# Patient Record
Sex: FEMALE | Race: WHITE | Employment: OTHER | URBAN - METROPOLITAN AREA
[De-identification: names, ages, dates, MRNs, and addresses within clinical notes are randomized per-mention and may not be internally consistent; named-entity substitution may affect disease eponyms.]

---

## 2017-02-04 ENCOUNTER — COMMUNICATION - HEALTHEAST (OUTPATIENT)
Dept: INTERNAL MEDICINE | Facility: CLINIC | Age: 72
End: 2017-02-04

## 2017-03-02 ENCOUNTER — COMMUNICATION - HEALTHEAST (OUTPATIENT)
Dept: INTERNAL MEDICINE | Facility: CLINIC | Age: 72
End: 2017-03-02

## 2017-04-19 ENCOUNTER — RECORDS - HEALTHEAST (OUTPATIENT)
Dept: ADMINISTRATIVE | Facility: OTHER | Age: 72
End: 2017-04-19

## 2017-04-19 ENCOUNTER — AMBULATORY - HEALTHEAST (OUTPATIENT)
Dept: ADMINISTRATIVE | Facility: REHABILITATION | Age: 72
End: 2017-04-19

## 2017-04-19 DIAGNOSIS — R42 DIZZINESS AND GIDDINESS: ICD-10-CM

## 2017-04-19 DIAGNOSIS — H90.5 SENSORINEURAL HEARING LOSS: ICD-10-CM

## 2017-05-15 ENCOUNTER — RECORDS - HEALTHEAST (OUTPATIENT)
Dept: ADMINISTRATIVE | Facility: OTHER | Age: 72
End: 2017-05-15

## 2017-06-20 ENCOUNTER — RECORDS - HEALTHEAST (OUTPATIENT)
Dept: ADMINISTRATIVE | Facility: OTHER | Age: 72
End: 2017-06-20

## 2017-08-30 ENCOUNTER — COMMUNICATION - HEALTHEAST (OUTPATIENT)
Dept: INTERNAL MEDICINE | Facility: CLINIC | Age: 72
End: 2017-08-30

## 2017-10-16 ENCOUNTER — COMMUNICATION - HEALTHEAST (OUTPATIENT)
Dept: INTERNAL MEDICINE | Facility: CLINIC | Age: 72
End: 2017-10-16

## 2017-10-16 ENCOUNTER — OFFICE VISIT - HEALTHEAST (OUTPATIENT)
Dept: INTERNAL MEDICINE | Facility: CLINIC | Age: 72
End: 2017-10-16

## 2017-10-16 DIAGNOSIS — E78.00 PURE HYPERCHOLESTEROLEMIA: ICD-10-CM

## 2017-10-16 DIAGNOSIS — Z01.818 PRE-OP EXAM: ICD-10-CM

## 2017-10-16 DIAGNOSIS — H26.9 CATARACT OF RIGHT EYE, UNSPECIFIED CATARACT TYPE: ICD-10-CM

## 2017-10-16 DIAGNOSIS — I10 ESSENTIAL HYPERTENSION: ICD-10-CM

## 2017-10-16 DIAGNOSIS — Z51.81 MEDICATION MONITORING ENCOUNTER: ICD-10-CM

## 2017-10-16 DIAGNOSIS — M85.80 OSTEOPENIA, UNSPECIFIED LOCATION: ICD-10-CM

## 2017-10-16 DIAGNOSIS — Z78.0 POST-MENOPAUSAL: ICD-10-CM

## 2017-10-16 LAB
ATRIAL RATE - MUSE: 53 BPM
DIASTOLIC BLOOD PRESSURE - MUSE: NORMAL MMHG
INTERPRETATION ECG - MUSE: NORMAL
P AXIS - MUSE: 34 DEGREES
PR INTERVAL - MUSE: 160 MS
QRS DURATION - MUSE: 92 MS
QT - MUSE: 458 MS
QTC - MUSE: 429 MS
R AXIS - MUSE: 28 DEGREES
SYSTOLIC BLOOD PRESSURE - MUSE: NORMAL MMHG
T AXIS - MUSE: -22 DEGREES
VENTRICULAR RATE- MUSE: 53 BPM

## 2017-10-16 ASSESSMENT — MIFFLIN-ST. JEOR: SCORE: 1287.4

## 2017-10-31 ENCOUNTER — COMMUNICATION - HEALTHEAST (OUTPATIENT)
Dept: INTERNAL MEDICINE | Facility: CLINIC | Age: 72
End: 2017-10-31

## 2017-10-31 DIAGNOSIS — E78.00 PURE HYPERCHOLESTEROLEMIA: ICD-10-CM

## 2017-11-07 ENCOUNTER — COMMUNICATION - HEALTHEAST (OUTPATIENT)
Dept: INTERNAL MEDICINE | Facility: CLINIC | Age: 72
End: 2017-11-07

## 2017-11-07 DIAGNOSIS — I10 ESSENTIAL HYPERTENSION: ICD-10-CM

## 2017-12-11 ENCOUNTER — RECORDS - HEALTHEAST (OUTPATIENT)
Dept: ADMINISTRATIVE | Facility: OTHER | Age: 72
End: 2017-12-11

## 2017-12-12 ENCOUNTER — OFFICE VISIT - HEALTHEAST (OUTPATIENT)
Dept: INTERNAL MEDICINE | Facility: CLINIC | Age: 72
End: 2017-12-12

## 2017-12-12 DIAGNOSIS — J32.9 SINUSITIS, UNSPECIFIED CHRONICITY, UNSPECIFIED LOCATION: ICD-10-CM

## 2017-12-12 DIAGNOSIS — H53.8 BLURRY VISION, RIGHT EYE: ICD-10-CM

## 2017-12-12 DIAGNOSIS — I10 ESSENTIAL HYPERTENSION: ICD-10-CM

## 2017-12-12 DIAGNOSIS — J30.89 CHRONIC NON-SEASONAL ALLERGIC RHINITIS, UNSPECIFIED TRIGGER: ICD-10-CM

## 2017-12-12 DIAGNOSIS — Z01.818 PREOP EXAMINATION: ICD-10-CM

## 2017-12-12 RX ORDER — KETOROLAC TROMETHAMINE 4 MG/ML
SOLUTION/ DROPS OPHTHALMIC
Refills: 4 | Status: SHIPPED | COMMUNITY
Start: 2017-11-29 | End: 2021-12-27

## 2017-12-12 ASSESSMENT — MIFFLIN-ST. JEOR: SCORE: 1278.33

## 2018-01-04 ENCOUNTER — RECORDS - HEALTHEAST (OUTPATIENT)
Dept: ADMINISTRATIVE | Facility: OTHER | Age: 73
End: 2018-01-04

## 2018-04-24 ENCOUNTER — RECORDS - HEALTHEAST (OUTPATIENT)
Dept: ADMINISTRATIVE | Facility: OTHER | Age: 73
End: 2018-04-24

## 2018-05-07 ENCOUNTER — RECORDS - HEALTHEAST (OUTPATIENT)
Dept: ADMINISTRATIVE | Facility: OTHER | Age: 73
End: 2018-05-07

## 2018-06-15 ENCOUNTER — OFFICE VISIT - HEALTHEAST (OUTPATIENT)
Dept: INTERNAL MEDICINE | Facility: CLINIC | Age: 73
End: 2018-06-15

## 2018-06-15 DIAGNOSIS — R07.89 RIGHT-SIDED CHEST WALL PAIN: ICD-10-CM

## 2018-06-15 DIAGNOSIS — E78.00 PURE HYPERCHOLESTEROLEMIA: ICD-10-CM

## 2018-06-15 DIAGNOSIS — Z51.81 MEDICATION MONITORING ENCOUNTER: ICD-10-CM

## 2018-06-15 DIAGNOSIS — I10 ESSENTIAL HYPERTENSION: ICD-10-CM

## 2018-06-15 LAB
ALBUMIN SERPL-MCNC: 3.9 G/DL (ref 3.5–5)
ALP SERPL-CCNC: 61 U/L (ref 45–120)
ALT SERPL W P-5'-P-CCNC: 25 U/L (ref 0–45)
ANION GAP SERPL CALCULATED.3IONS-SCNC: 7 MMOL/L (ref 5–18)
AST SERPL W P-5'-P-CCNC: 27 U/L (ref 0–40)
BASOPHILS # BLD AUTO: 0 THOU/UL (ref 0–0.2)
BASOPHILS NFR BLD AUTO: 1 % (ref 0–2)
BILIRUB SERPL-MCNC: 0.7 MG/DL (ref 0–1)
BUN SERPL-MCNC: 16 MG/DL (ref 8–28)
CALCIUM SERPL-MCNC: 9.4 MG/DL (ref 8.5–10.5)
CHLORIDE BLD-SCNC: 108 MMOL/L (ref 98–107)
CHOLEST SERPL-MCNC: 238 MG/DL
CK SERPL-CCNC: 59 U/L (ref 30–190)
CO2 SERPL-SCNC: 28 MMOL/L (ref 22–31)
CREAT SERPL-MCNC: 0.81 MG/DL (ref 0.6–1.1)
EOSINOPHIL # BLD AUTO: 0.1 THOU/UL (ref 0–0.4)
EOSINOPHIL NFR BLD AUTO: 2 % (ref 0–6)
ERYTHROCYTE [DISTWIDTH] IN BLOOD BY AUTOMATED COUNT: 12 % (ref 11–14.5)
FASTING STATUS PATIENT QL REPORTED: YES
GFR SERPL CREATININE-BSD FRML MDRD: >60 ML/MIN/1.73M2
GLUCOSE BLD-MCNC: 92 MG/DL (ref 70–125)
HCT VFR BLD AUTO: 44 % (ref 35–47)
HDLC SERPL-MCNC: 60 MG/DL
HGB BLD-MCNC: 14.6 G/DL (ref 12–16)
LDLC SERPL CALC-MCNC: 156 MG/DL
LYMPHOCYTES # BLD AUTO: 1.5 THOU/UL (ref 0.8–4.4)
LYMPHOCYTES NFR BLD AUTO: 35 % (ref 20–40)
MCH RBC QN AUTO: 30.3 PG (ref 27–34)
MCHC RBC AUTO-ENTMCNC: 33.1 G/DL (ref 32–36)
MCV RBC AUTO: 92 FL (ref 80–100)
MONOCYTES # BLD AUTO: 0.5 THOU/UL (ref 0–0.9)
MONOCYTES NFR BLD AUTO: 11 % (ref 2–10)
NEUTROPHILS # BLD AUTO: 2.2 THOU/UL (ref 2–7.7)
NEUTROPHILS NFR BLD AUTO: 52 % (ref 50–70)
PLATELET # BLD AUTO: 209 THOU/UL (ref 140–440)
PMV BLD AUTO: 7.4 FL (ref 7–10)
POTASSIUM BLD-SCNC: 4.5 MMOL/L (ref 3.5–5)
PROT SERPL-MCNC: 6.4 G/DL (ref 6–8)
RBC # BLD AUTO: 4.8 MILL/UL (ref 3.8–5.4)
SODIUM SERPL-SCNC: 143 MMOL/L (ref 136–145)
TRIGL SERPL-MCNC: 109 MG/DL
WBC: 4.3 THOU/UL (ref 4–11)

## 2018-06-15 ASSESSMENT — MIFFLIN-ST. JEOR: SCORE: 1287.4

## 2018-06-18 ENCOUNTER — RECORDS - HEALTHEAST (OUTPATIENT)
Dept: ADMINISTRATIVE | Facility: OTHER | Age: 73
End: 2018-06-18

## 2018-06-18 ENCOUNTER — COMMUNICATION - HEALTHEAST (OUTPATIENT)
Dept: INTERNAL MEDICINE | Facility: CLINIC | Age: 73
End: 2018-06-18

## 2018-08-27 ENCOUNTER — COMMUNICATION - HEALTHEAST (OUTPATIENT)
Dept: INTERNAL MEDICINE | Facility: CLINIC | Age: 73
End: 2018-08-27

## 2018-08-27 DIAGNOSIS — I10 ESSENTIAL HYPERTENSION: ICD-10-CM

## 2018-10-27 ENCOUNTER — COMMUNICATION - HEALTHEAST (OUTPATIENT)
Dept: INTERNAL MEDICINE | Facility: CLINIC | Age: 73
End: 2018-10-27

## 2018-10-27 DIAGNOSIS — E78.00 PURE HYPERCHOLESTEROLEMIA: ICD-10-CM

## 2018-10-27 DIAGNOSIS — I10 ESSENTIAL HYPERTENSION: ICD-10-CM

## 2019-05-17 ENCOUNTER — RECORDS - HEALTHEAST (OUTPATIENT)
Dept: ADMINISTRATIVE | Facility: OTHER | Age: 74
End: 2019-05-17

## 2019-05-25 ENCOUNTER — COMMUNICATION - HEALTHEAST (OUTPATIENT)
Dept: INTERNAL MEDICINE | Facility: CLINIC | Age: 74
End: 2019-05-25

## 2019-05-25 DIAGNOSIS — I10 ESSENTIAL HYPERTENSION: ICD-10-CM

## 2019-06-11 ENCOUNTER — RECORDS - HEALTHEAST (OUTPATIENT)
Dept: ADMINISTRATIVE | Facility: OTHER | Age: 74
End: 2019-06-11

## 2019-07-26 ENCOUNTER — COMMUNICATION - HEALTHEAST (OUTPATIENT)
Dept: INTERNAL MEDICINE | Facility: CLINIC | Age: 74
End: 2019-07-26

## 2019-07-26 DIAGNOSIS — I10 ESSENTIAL HYPERTENSION: ICD-10-CM

## 2019-07-26 DIAGNOSIS — E78.00 PURE HYPERCHOLESTEROLEMIA: ICD-10-CM

## 2019-08-09 ENCOUNTER — RECORDS - HEALTHEAST (OUTPATIENT)
Dept: ADMINISTRATIVE | Facility: OTHER | Age: 74
End: 2019-08-09

## 2019-08-25 ENCOUNTER — COMMUNICATION - HEALTHEAST (OUTPATIENT)
Dept: INTERNAL MEDICINE | Facility: CLINIC | Age: 74
End: 2019-08-25

## 2019-08-25 DIAGNOSIS — I10 ESSENTIAL HYPERTENSION: ICD-10-CM

## 2019-10-23 ENCOUNTER — COMMUNICATION - HEALTHEAST (OUTPATIENT)
Dept: INTERNAL MEDICINE | Facility: CLINIC | Age: 74
End: 2019-10-23

## 2019-10-23 DIAGNOSIS — I10 ESSENTIAL HYPERTENSION: ICD-10-CM

## 2019-10-23 DIAGNOSIS — E78.00 PURE HYPERCHOLESTEROLEMIA: ICD-10-CM

## 2019-11-13 ENCOUNTER — RECORDS - HEALTHEAST (OUTPATIENT)
Dept: ADMINISTRATIVE | Facility: OTHER | Age: 74
End: 2019-11-13

## 2020-01-22 ENCOUNTER — COMMUNICATION - HEALTHEAST (OUTPATIENT)
Dept: INTERNAL MEDICINE | Facility: CLINIC | Age: 75
End: 2020-01-22

## 2020-01-22 DIAGNOSIS — I10 ESSENTIAL HYPERTENSION: ICD-10-CM

## 2020-01-22 DIAGNOSIS — E78.00 PURE HYPERCHOLESTEROLEMIA: ICD-10-CM

## 2020-04-21 ENCOUNTER — COMMUNICATION - HEALTHEAST (OUTPATIENT)
Dept: INTERNAL MEDICINE | Facility: CLINIC | Age: 75
End: 2020-04-21

## 2020-04-21 DIAGNOSIS — E78.00 PURE HYPERCHOLESTEROLEMIA: ICD-10-CM

## 2020-04-21 DIAGNOSIS — I10 ESSENTIAL HYPERTENSION: ICD-10-CM

## 2020-04-29 ENCOUNTER — OFFICE VISIT - HEALTHEAST (OUTPATIENT)
Dept: INTERNAL MEDICINE | Facility: CLINIC | Age: 75
End: 2020-04-29

## 2020-04-29 DIAGNOSIS — E78.00 PURE HYPERCHOLESTEROLEMIA: ICD-10-CM

## 2020-04-29 DIAGNOSIS — I10 ESSENTIAL HYPERTENSION: ICD-10-CM

## 2020-04-29 DIAGNOSIS — R25.1 TREMOR: ICD-10-CM

## 2020-04-29 DIAGNOSIS — Z12.11 SCREENING FOR COLON CANCER: ICD-10-CM

## 2020-04-29 DIAGNOSIS — Z51.81 ENCOUNTER FOR THERAPEUTIC DRUG MONITORING: ICD-10-CM

## 2020-04-29 LAB
ALBUMIN SERPL-MCNC: 3.9 G/DL (ref 3.5–5)
ALP SERPL-CCNC: 65 U/L (ref 45–120)
ALT SERPL W P-5'-P-CCNC: 13 U/L (ref 0–45)
ANION GAP SERPL CALCULATED.3IONS-SCNC: 10 MMOL/L (ref 5–18)
AST SERPL W P-5'-P-CCNC: 16 U/L (ref 0–40)
BILIRUB SERPL-MCNC: 0.6 MG/DL (ref 0–1)
BUN SERPL-MCNC: 15 MG/DL (ref 8–28)
CALCIUM SERPL-MCNC: 9 MG/DL (ref 8.5–10.5)
CHLORIDE BLD-SCNC: 104 MMOL/L (ref 98–107)
CHOLEST SERPL-MCNC: 222 MG/DL
CO2 SERPL-SCNC: 28 MMOL/L (ref 22–31)
CREAT SERPL-MCNC: 0.79 MG/DL (ref 0.6–1.1)
FASTING STATUS PATIENT QL REPORTED: YES
GFR SERPL CREATININE-BSD FRML MDRD: >60 ML/MIN/1.73M2
GLUCOSE BLD-MCNC: 83 MG/DL (ref 70–125)
HDLC SERPL-MCNC: 64 MG/DL
LDLC SERPL CALC-MCNC: 137 MG/DL
POTASSIUM BLD-SCNC: 4.1 MMOL/L (ref 3.5–5)
PROT SERPL-MCNC: 6.7 G/DL (ref 6–8)
SODIUM SERPL-SCNC: 142 MMOL/L (ref 136–145)
TRIGL SERPL-MCNC: 103 MG/DL

## 2020-04-30 ENCOUNTER — COMMUNICATION - HEALTHEAST (OUTPATIENT)
Dept: INTERNAL MEDICINE | Facility: CLINIC | Age: 75
End: 2020-04-30

## 2020-05-15 ENCOUNTER — OFFICE VISIT - HEALTHEAST (OUTPATIENT)
Dept: INTERNAL MEDICINE | Facility: CLINIC | Age: 75
End: 2020-05-15

## 2020-05-15 DIAGNOSIS — I10 ESSENTIAL HYPERTENSION: ICD-10-CM

## 2020-06-19 ENCOUNTER — COMMUNICATION - HEALTHEAST (OUTPATIENT)
Dept: INTERNAL MEDICINE | Facility: CLINIC | Age: 75
End: 2020-06-19

## 2020-06-19 DIAGNOSIS — I10 ESSENTIAL HYPERTENSION: ICD-10-CM

## 2020-07-21 ENCOUNTER — COMMUNICATION - HEALTHEAST (OUTPATIENT)
Dept: INTERNAL MEDICINE | Facility: CLINIC | Age: 75
End: 2020-07-21

## 2020-07-21 DIAGNOSIS — E78.00 PURE HYPERCHOLESTEROLEMIA: ICD-10-CM

## 2020-07-21 DIAGNOSIS — I10 ESSENTIAL HYPERTENSION: ICD-10-CM

## 2020-07-22 RX ORDER — AMLODIPINE BESYLATE 2.5 MG/1
TABLET ORAL
Qty: 90 TABLET | Refills: 2 | Status: SHIPPED | OUTPATIENT
Start: 2020-07-22 | End: 2021-12-27 | Stop reason: DRUGHIGH

## 2020-08-16 ENCOUNTER — COMMUNICATION - HEALTHEAST (OUTPATIENT)
Dept: INTERNAL MEDICINE | Facility: CLINIC | Age: 75
End: 2020-08-16

## 2020-08-16 DIAGNOSIS — I10 ESSENTIAL HYPERTENSION: ICD-10-CM

## 2020-12-01 ENCOUNTER — RECORDS - HEALTHEAST (OUTPATIENT)
Dept: ADMINISTRATIVE | Facility: OTHER | Age: 75
End: 2020-12-01

## 2021-01-20 ENCOUNTER — COMMUNICATION - HEALTHEAST (OUTPATIENT)
Dept: INTERNAL MEDICINE | Facility: CLINIC | Age: 76
End: 2021-01-20

## 2021-01-20 DIAGNOSIS — E78.00 PURE HYPERCHOLESTEROLEMIA: ICD-10-CM

## 2021-01-21 RX ORDER — PRAVASTATIN SODIUM 20 MG
20 TABLET ORAL DAILY
Qty: 90 TABLET | Refills: 2 | Status: SHIPPED | OUTPATIENT
Start: 2021-01-21 | End: 2022-01-21

## 2021-01-29 ENCOUNTER — RECORDS - HEALTHEAST (OUTPATIENT)
Dept: ADMINISTRATIVE | Facility: OTHER | Age: 76
End: 2021-01-29

## 2021-02-11 ENCOUNTER — AMBULATORY - HEALTHEAST (OUTPATIENT)
Dept: NURSING | Facility: CLINIC | Age: 76
End: 2021-02-11

## 2021-03-04 ENCOUNTER — AMBULATORY - HEALTHEAST (OUTPATIENT)
Dept: NURSING | Facility: CLINIC | Age: 76
End: 2021-03-04

## 2021-05-01 ENCOUNTER — HEALTH MAINTENANCE LETTER (OUTPATIENT)
Age: 76
End: 2021-05-01

## 2021-05-29 NOTE — TELEPHONE ENCOUNTER
Refill Approved    Rx renewed per Medication Renewal Policy. Medication was last renewed on 8/28/18.    Julia Gonzalez, [facility] Triage/Med Refill 5/25/2019     Requested Prescriptions   Pending Prescriptions Disp Refills     propranolol (INDERAL LA) 80 mg 24 hr capsule [Pharmacy Med Name: PROPRANOLOL ER 80MG CAPSULES] 90 capsule 0     Sig: TAKE 1 CAPSULE BY MOUTH EVERY DAY       Beta-Blockers Refill Protocol Passed - 5/25/2019 10:23 AM        Passed - PCP or prescribing provider visit in past 12 months or next 3 months     Last office visit with prescriber/PCP: 6/15/2018 José Miguel Sheppard MD OR same dept: Visit date not found OR same specialty: 6/15/2018 José Miguel Sheppard MD  Last physical: 12/12/2017 Last MTM visit: Visit date not found   Next visit within 3 mo: Visit date not found  Next physical within 3 mo: Visit date not found  Prescriber OR PCP: José Miguel Sheppard MD  Last diagnosis associated with med order: There are no diagnoses linked to this encounter.  If protocol passes may refill for 12 months if within 3 months of last provider visit (or a total of 15 months).             Passed - Blood pressure filed in past 12 months     BP Readings from Last 1 Encounters:   06/15/18 132/88

## 2021-05-30 NOTE — TELEPHONE ENCOUNTER
RN cannot approve Refill Request    RN can NOT refill this medication PCP messaged that patient is overdue for Office Visit. Last office visit: 6/15/2018 José Miguel Sheppard MD Last Physical: 12/12/2017 Last MTM visit: Visit date not found Last visit same specialty: 6/15/2018 José Miguel Sheppard MD.  Next visit within 3 mo: Visit date not found  Next physical within 3 mo: Visit date not found      Marybeth Romo, [facility] Triage/Med Refill 7/26/2019    Requested Prescriptions   Pending Prescriptions Disp Refills     amLODIPine (NORVASC) 2.5 MG tablet [Pharmacy Med Name: AMLODIPINE BESYLATE 2.5MG TABLETS] 90 tablet 0     Sig: TAKE 1 TABLET BY MOUTH DAILY       Calcium-Channel Blockers Protocol Failed - 7/26/2019 10:55 AM        Failed - PCP or prescribing provider visit in past 12 months or next 3 months     Last office visit with prescriber/PCP: 6/15/2018 José Miguel Sheppard MD OR same dept: Visit date not found OR same specialty: 6/15/2018 José Miguel Sheppard MD  Last physical: 12/12/2017 Last MTM visit: Visit date not found   Next visit within 3 mo: Visit date not found  Next physical within 3 mo: Visit date not found  Prescriber OR PCP: José Miguel Sheppard MD  Last diagnosis associated with med order: 1. Essential hypertension  - amLODIPine (NORVASC) 2.5 MG tablet [Pharmacy Med Name: AMLODIPINE BESYLATE 2.5MG TABLETS]; TAKE 1 TABLET BY MOUTH DAILY  Dispense: 90 tablet; Refill: 0    2. Hypercholesterolemia  - pravastatin (PRAVACHOL) 20 MG tablet [Pharmacy Med Name: PRAVASTATIN 20MG TABLETS]; TAKE 1 TABLET BY MOUTH DAILY  Dispense: 90 tablet; Refill: 0    If protocol passes may refill for 12 months if within 3 months of last provider visit (or a total of 15 months).             Failed - Blood pressure filed in past 12 months     BP Readings from Last 1 Encounters:   06/15/18 132/88             pravastatin (PRAVACHOL) 20 MG tablet [Pharmacy Med Name: PRAVASTATIN 20MG TABLETS] 90 tablet 0     Sig: TAKE 1 TABLET BY MOUTH DAILY        Statins Refill Protocol (Hmg CoA Reductase Inhibitors) Failed - 7/26/2019 10:55 AM        Failed - PCP or prescribing provider visit in past 12 months      Last office visit with prescriber/PCP: 6/15/2018 José Miguel Sheppard MD OR same dept: Visit date not found OR same specialty: 6/15/2018 José Miguel Sheppard MD  Last physical: 12/12/2017 Last MTM visit: Visit date not found   Next visit within 3 mo: Visit date not found  Next physical within 3 mo: Visit date not found  Prescriber OR PCP: José Miguel Sheppard MD  Last diagnosis associated with med order: 1. Essential hypertension  - amLODIPine (NORVASC) 2.5 MG tablet [Pharmacy Med Name: AMLODIPINE BESYLATE 2.5MG TABLETS]; TAKE 1 TABLET BY MOUTH DAILY  Dispense: 90 tablet; Refill: 0    2. Hypercholesterolemia  - pravastatin (PRAVACHOL) 20 MG tablet [Pharmacy Med Name: PRAVASTATIN 20MG TABLETS]; TAKE 1 TABLET BY MOUTH DAILY  Dispense: 90 tablet; Refill: 0    If protocol passes may refill for 12 months if within 3 months of last provider visit (or a total of 15 months).

## 2021-05-31 VITALS — BODY MASS INDEX: 24.55 KG/M2 | HEIGHT: 68 IN | WEIGHT: 162 LBS

## 2021-05-31 VITALS — HEIGHT: 68 IN | BODY MASS INDEX: 24.86 KG/M2 | WEIGHT: 164 LBS

## 2021-06-01 VITALS — BODY MASS INDEX: 24.86 KG/M2 | HEIGHT: 68 IN | WEIGHT: 164 LBS

## 2021-06-02 NOTE — TELEPHONE ENCOUNTER
RN cannot approve Refill Request    RN can NOT refill this medication Protocol failed and NO refill given.       Qi Rivera, [facility] Triage/Med Refill 10/23/2019    Requested Prescriptions   Pending Prescriptions Disp Refills     pravastatin (PRAVACHOL) 20 MG tablet [Pharmacy Med Name: PRAVASTATIN 20MG TABLETS] 90 tablet 3     Sig: TAKE 1 TABLET BY MOUTH DAILY       Statins Refill Protocol (Hmg CoA Reductase Inhibitors) Failed - 10/23/2019  1:36 PM        Failed - PCP or prescribing provider visit in past 12 months      Last office visit with prescriber/PCP: 6/15/2018 José Miguel Sheppard MD OR same dept: Visit date not found OR same specialty: 6/15/2018 José Miguel Sheppard MD  Last physical: 12/12/2017 Last MTM visit: Visit date not found   Next visit within 3 mo: Visit date not found  Next physical within 3 mo: Visit date not found  Prescriber OR PCP: José Miguel Sheppard MD  Last diagnosis associated with med order: 1. Hypercholesterolemia  - pravastatin (PRAVACHOL) 20 MG tablet [Pharmacy Med Name: PRAVASTATIN 20MG TABLETS]; TAKE 1 TABLET BY MOUTH DAILY  Dispense: 90 tablet; Refill: 0    2. Essential hypertension  - amLODIPine (NORVASC) 2.5 MG tablet [Pharmacy Med Name: AMLODIPINE BESYLATE 2.5MG TABLETS]; TAKE 1 TABLET BY MOUTH DAILY  Dispense: 90 tablet; Refill: 0    If protocol passes may refill for 12 months if within 3 months of last provider visit (or a total of 15 months).             amLODIPine (NORVASC) 2.5 MG tablet [Pharmacy Med Name: AMLODIPINE BESYLATE 2.5MG TABLETS] 90 tablet 3     Sig: TAKE 1 TABLET BY MOUTH DAILY       Calcium-Channel Blockers Protocol Failed - 10/23/2019  1:36 PM        Failed - PCP or prescribing provider visit in past 12 months or next 3 months     Last office visit with prescriber/PCP: 6/15/2018 José Miguel Sheppard MD OR sherita dept: Visit date not found OR same specialty: 6/15/2018 José Miguel Sheppard MD  Last physical: 12/12/2017 Last MTM visit: Visit date not found   Next visit within 3  mo: Visit date not found  Next physical within 3 mo: Visit date not found  Prescriber OR PCP: José Miguel Sheppard MD  Last diagnosis associated with med order: 1. Hypercholesterolemia  - pravastatin (PRAVACHOL) 20 MG tablet [Pharmacy Med Name: PRAVASTATIN 20MG TABLETS]; TAKE 1 TABLET BY MOUTH DAILY  Dispense: 90 tablet; Refill: 0    2. Essential hypertension  - amLODIPine (NORVASC) 2.5 MG tablet [Pharmacy Med Name: AMLODIPINE BESYLATE 2.5MG TABLETS]; TAKE 1 TABLET BY MOUTH DAILY  Dispense: 90 tablet; Refill: 0    If protocol passes may refill for 12 months if within 3 months of last provider visit (or a total of 15 months).             Failed - Blood pressure filed in past 12 months     BP Readings from Last 1 Encounters:   06/15/18 132/88

## 2021-06-04 VITALS
DIASTOLIC BLOOD PRESSURE: 80 MMHG | SYSTOLIC BLOOD PRESSURE: 150 MMHG | BODY MASS INDEX: 24.63 KG/M2 | WEIGHT: 162 LBS | HEART RATE: 64 BPM

## 2021-06-05 NOTE — TELEPHONE ENCOUNTER
Who is calling:  Patient  Reason for Call:  She attempted to schedule before leaving for [place] and was not able to get through.  She will be returning at the end of April/early May and was transferred to [facility] to make a future appointment.    Medications were pended again - will the provider bridge her until she returns to [place]?  Date of last appointment with primary care: 6.2018  Okay to leave a detailed message: Yes

## 2021-06-05 NOTE — TELEPHONE ENCOUNTER
Patient Returning Call  Reason for call:  Medication refill   Information relayed to patient:  Scheduled appointment for 5/04 @9:00.  Patient has additional questions:  Yes  If YES, what are your questions/concerns:  Now she has appointment set will she get her medication refilled.  Okay to leave a detailed message?: Yes

## 2021-06-07 NOTE — TELEPHONE ENCOUNTER
RN cannot approve Refill Request    RN can NOT refill this medication Protocol failed and NO refill given.         Qi Rivera, [facility] Triage/Med Refill 4/22/2020    Requested Prescriptions   Pending Prescriptions Disp Refills     amLODIPine (NORVASC) 2.5 MG tablet [Pharmacy Med Name: AMLODIPINE BESYLATE 2.5MG TABLETS] 90 tablet 3     Sig: TAKE 1 TABLET(2.5 MG) BY MOUTH DAILY       Calcium-Channel Blockers Protocol Failed - 4/21/2020  9:28 AM        Failed - PCP or prescribing provider visit in past 12 months or next 3 months     Last office visit with prescriber/PCP: 6/15/2018 José Miguel Sheppard MD OR same dept: Visit date not found OR same specialty: 6/15/2018 José Miguel Sheppard MD  Last physical: 12/12/2017 Last MTM visit: Visit date not found   Next visit within 3 mo: Visit date not found  Next physical within 3 mo: Visit date not found  Prescriber OR PCP: José Miguel Sheppard MD  Last diagnosis associated with med order: 1. Essential hypertension  - amLODIPine (NORVASC) 2.5 MG tablet [Pharmacy Med Name: AMLODIPINE BESYLATE 2.5MG TABLETS]; TAKE 1 TABLET(2.5 MG) BY MOUTH DAILY  Dispense: 90 tablet; Refill: 0    2. Hypercholesterolemia  - pravastatin (PRAVACHOL) 20 MG tablet [Pharmacy Med Name: PRAVASTATIN 20MG TABLETS]; TAKE 1 TABLET(20 MG) BY MOUTH DAILY  Dispense: 90 tablet; Refill: 0    If protocol passes may refill for 12 months if within 3 months of last provider visit (or a total of 15 months).             Failed - Blood pressure filed in past 12 months     BP Readings from Last 1 Encounters:   06/15/18 132/88                pravastatin (PRAVACHOL) 20 MG tablet [Pharmacy Med Name: PRAVASTATIN 20MG TABLETS] 90 tablet 3     Sig: TAKE 1 TABLET(20 MG) BY MOUTH DAILY       Statins Refill Protocol (Hmg CoA Reductase Inhibitors) Failed - 4/21/2020  9:28 AM        Failed - PCP or prescribing provider visit in past 12 months      Last office visit with prescriber/PCP: 6/15/2018 José Miguel Sheppard MD OR same dept: Visit  date not found OR same specialty: 6/15/2018 José Miguel Sheppard MD  Last physical: 12/12/2017 Last MTM visit: Visit date not found   Next visit within 3 mo: Visit date not found  Next physical within 3 mo: Visit date not found  Prescriber OR PCP: José Miguel Sheppard MD  Last diagnosis associated with med order: 1. Essential hypertension  - amLODIPine (NORVASC) 2.5 MG tablet [Pharmacy Med Name: AMLODIPINE BESYLATE 2.5MG TABLETS]; TAKE 1 TABLET(2.5 MG) BY MOUTH DAILY  Dispense: 90 tablet; Refill: 0    2. Hypercholesterolemia  - pravastatin (PRAVACHOL) 20 MG tablet [Pharmacy Med Name: PRAVASTATIN 20MG TABLETS]; TAKE 1 TABLET(20 MG) BY MOUTH DAILY  Dispense: 90 tablet; Refill: 0    If protocol passes may refill for 12 months if within 3 months of last provider visit (or a total of 15 months).

## 2021-06-07 NOTE — PATIENT INSTRUCTIONS - HE
Check blood pressures more regularly.  Goal blood pressure less than 135/85.    If blood pressure is running higher than that, I will plan to increase her amlodipine to 5 mg a day.    Schedule a phone consult in 2 weeks to discuss blood pressure.    Schedule a physical exam this summer or fall, for your health maintenance review.

## 2021-06-07 NOTE — PROGRESS NOTES
[facility] clinic Follow Up Note    Shanna Coto   74 y.o. female    Date of Visit: 4/29/2020    Chief Complaint   Patient presents with     Hypertension     High blood pressures     Concerns     Was sick during travels in [place]. Returned on 04/02.     Subjective  Shanna is here for a blood pressure follow-up.  She last saw me in June 2018.    She has been on a stable dose of amlodipine 2.5 mg a day and Inderal LA 80 mg a day for a number of years.  Blood pressure is usually been well controlled in the past.    She also uses the Inderal for a full body essential type tremor.    She has remained quite active in the past.  No history of cardiac events.    She has not had previous cardiac work-up.  She is on pravastatin 20 mg a day.  In 2018  with HDL 60.  Normal kidney and liver tests and blood sugar.    Goal LDL less than 160 previously.    2010 MRA negative for carotid artery stenosis.    She is never smoked.    She remains active and no chest pain or chest pressure or increasing shortness of breath or palpitations.    Patient did have a febrile illness in late February in [facility].  She thought was a bad sinus infection.  She did have a low-grade fever.  She did seek a local clinic there at the time and was treated with doxycycline and methylprednisolone.    The methylprednisolone caused significant spike in blood pressure up to 215/107 for a number of weeks.  It slowly came down after the sinus infection.    She did return to [place] and earlier this week blood pressure was on the low side at 109/69.  She denies lightheaded dizzy spells.    No edema.    Today in clinic blood pressure recheck was 150/80.    No headache or sinus pressure currently.  No fever or cough.    Past history of silicone breast implants.    Chronic constipation, saw GI in May 2019.  Normal TSH.  Last colonoscopy 2007, negative.  No family history of colon cancer or previous polyp.  Bowels are regular without  blood in stool.    Never smoked    PMHx:  No past medical history on file.  PSHx:    Past Surgical History:   Procedure Laterality Date     ID REDUCTION OF LARGE BREAST      Description: Breast Surgery Enlargement Procedure Elective;  Recorded: 12/18/2009;  Comments: naveed     Immunizations:   Immunization History   Administered Date(s) Administered     Influenza [id number], 12/17/2009     Influenza high dose,seasonal,PF, 65+ yrs 12/18/2015, 10/16/2017     Influenza, inj, historic,unspecified 10/22/2008, 12/17/2009, 10/28/2010     Influenza, seasonal,quad inj 6-35 mos 11/12/2013, 10/10/2014     Pneumo Polysac 23-V 12/10/2012     Td,adult,historic,unspecified 06/17/2008     Tdap 06/17/2008       ROS A comprehensive review of systems was performed and was otherwise negative    Medications, allergies, and problem list were reviewed and updated    Exam  There were no vitals taken for this visit.  Patient is alert and oriented x3.  No jaundice.  No JVD.  Lungs are clear.  Heart is regular with no murmur rub or gallop.  No ankle edema.  Abdomen nontender  Blood pressure was 150/80 on my check.  Initially was 146/84.  Heart rate 64.  Weight 162 pounds.  Assessment/Plan  1. Essential hypertension  Blood pressure was very high after steroids last month.  Blood pressure was borderline low when checked earlier this month.    She is fully recovered from her febrile illness.  Possible coronavirus illness in February.    Blood pressure was borderline high in clinic today.    Patient may need to increase amlodipine to 5 mg a day.    I will schedule a phone consult in 2 weeks to discuss blood pressure when she has had more time to stabilize.    Continue Inderal LA 80 mg a day, also used for tremor.    2. Hypercholesterolemia  Pravastatin 20 mg.  I encouraged her to return for a physical exam the summer.  I would like her to consider a cardiac CT scan to determine goal LDL range.    Currently goal LDL less than 160, moderate  vascular risk factors.  - Lipid Cascade    3. Tremor  Controlled on Inderal    4. Encounter for therapeutic drug monitoring    - Comprehensive Metabolic Panel    Febrile illness in February.  Possible coronavirus.  I did contact the lab and antibody testing was not available today for patient.  She is fully recovered.  She has been well for over 2 weeks.    Colon cancer screening, overdue.  Patient requested Cologuard, order placed.    Return in about 2 weeks (around 5/13/2020) for Recheck.   Patient Instructions   Check blood pressures more regularly.  Goal blood pressure less than 135/85.    If blood pressure is running higher than that, I will plan to increase her amlodipine to 5 mg a day.    Schedule a phone consult in 2 weeks to discuss blood pressure.    Schedule a physical exam this summer or fall, for your health maintenance review.        José Miguel Sheppard MD        Current Outpatient Medications   Medication Sig Dispense Refill     amLODIPine (NORVASC) 2.5 MG tablet TAKE 1 TABLET(2.5 MG) BY MOUTH DAILY 90 tablet 0     fluticasone (FLONASE) 50 mcg/actuation nasal spray USE TWO SPRAYS IN EACH NOSTRIL DAILY 48 g 2     ketorolac (ACULAR LS) 0.4 % Drop INT 1 GTT IN OD QID  4     pravastatin (PRAVACHOL) 20 MG tablet TAKE 1 TABLET(20 MG) BY MOUTH DAILY 90 tablet 0     propranolol (INDERAL LA) 80 mg 24 hr capsule TAKE 1 CAPSULE(80 MG) BY MOUTH DAILY 90 capsule 3     No current facility-administered medications for this visit.      Allergies   Allergen Reactions     Ampicillin      Penicillins Rash     Social History     Tobacco Use     Smoking status: Never Smoker   Substance Use Topics     Alcohol use: Not on file     Drug use: Not on file

## 2021-06-08 NOTE — PROGRESS NOTES
"Shanna Coto is a 74 y.o. female who is being evaluated via a billable telephone visit.      The patient has been notified of following:     \"This telephone visit will be conducted via a call between you and your physician/provider. We have found that certain health care needs can be provided without the need for a physical exam.  This service lets us provide the care you need with a short phone conversation.  If a prescription is necessary we can send it directly to your pharmacy.  If lab work is needed we can place an order for that and you can then stop by our lab to have the test done at a later time.    Telephone visits are billed at different rates depending on your insurance coverage. During this emergency period, for some insurers they may be billed the same as an in-person visit.  Please reach out to your insurance provider with any questions.    If during the course of the call the physician/provider feels a telephone visit is not appropriate, you will not be charged for this service.\"    Patient has given verbal consent to a Telephone visit? No    What phone number would you like to be contacted at?     Patient would like to receive their AVS by AVS Preference: Martine.    Additional provider notes:    [facility] Follow Up Note    Shanna Coto   74 y.o. female    Date of Visit: 5/15/2020    No chief complaint on file.    Subjective  Patient's blood pressure has been running 1 20-1 50s over 60s to 70s.  Her highest was 154/78.  Most blood pressures around 130s over 70s.    She did not want increase the amlodipine at this time.  She still on 2.5 mg of amlodipine and Inderal LA 80 mg a day which she also uses for tremor.    She otherwise feels well and no cough or fever.  She had a febrile illness while in [place] in February and was treated with doxycycline and prednisone.    She did not want to check a COVID-19 antibody test at this time.    She continues on pravastatin 20 mg a " day for hypercholesterolemia.    She did receive the Cologuard but is not done yet.    PMHx:  No past medical history on file.  PSHx:    Past Surgical History:   Procedure Laterality Date     NM REDUCTION OF LARGE BREAST      Description: Breast Surgery Enlargement Procedure Elective;  Recorded: 12/18/2009;  Comments: naveed     Immunizations:   Immunization History   Administered Date(s) Administered     Influenza Q1r2-03, 12/17/2009     Influenza high dose,seasonal,PF, 65+ yrs 12/18/2015, 10/16/2017     Influenza, inj, historic,unspecified 10/22/2008, 12/17/2009, 10/28/2010     Influenza, seasonal,quad inj 6-35 mos 11/12/2013, 10/10/2014     Pneumo Polysac 23-V 12/10/2012     Td,adult,historic,unspecified 06/17/2008     Tdap 06/17/2008       ROS A comprehensive review of systems was performed and was otherwise negative    Medications, allergies, and problem list were reviewed and updated    Exam  There were no vitals taken for this visit.  Phone consult    Assessment/Plan  1. Essential hypertension  Blood pressure controlled.  She was going to continue on the current blood pressure medications.  Increase amlodipine if needed and follow-up in September for physical.    Proceed with Cologuard.    Consider cardiac CT scan this fall to determine goal LDL level.  Continue pravastatin 20 mg a day at this time.    Return in 4 months (on 9/23/2020) for Annual physical.   Patient Instructions   If your blood pressure is running above 135/85, increase her amlodipine to 5 mg a day.  Contact me if you have any questions, or if you need a new prescription for 5 mg tablets of amlodipine.    You can request a COVID-19 antibody test, if you wish.    Proceed with Cologuard colon cancer screening this summer.    Follow-up with me on September 23 at 10:20 AM for your fasting physical exam.    José Miguel Sheppard MD        Current Outpatient Medications   Medication Sig Dispense Refill     amLODIPine (NORVASC) 2.5 MG tablet TAKE 1  TABLET(2.5 MG) BY MOUTH DAILY 90 tablet 0     fluticasone (FLONASE) 50 mcg/actuation nasal spray USE TWO SPRAYS IN EACH NOSTRIL DAILY 48 g 2     ketorolac (ACULAR LS) 0.4 % Drop INT 1 GTT IN OD QID  4     pravastatin (PRAVACHOL) 20 MG tablet TAKE 1 TABLET(20 MG) BY MOUTH DAILY 90 tablet 0     propranolol (INDERAL LA) 80 mg 24 hr capsule TAKE 1 CAPSULE(80 MG) BY MOUTH DAILY 90 capsule 3     No current facility-administered medications for this visit.      Allergies   Allergen Reactions     Ampicillin      Penicillins Rash     Social History     Tobacco Use     Smoking status: Never Smoker   Substance Use Topics     Alcohol use: Not on file     Drug use: Not on file           Phone call duration: 4 minutes    José Miguel Sheppard MD

## 2021-06-08 NOTE — PATIENT INSTRUCTIONS - HE
If your blood pressure is running above 135/85, increase her amlodipine to 5 mg a day.  Contact me if you have any questions, or if you need a new prescription for 5 mg tablets of amlodipine.    You can request a COVID-19 antibody test, if you wish.    Proceed with Cologuard colon cancer screening this summer.    Follow-up with me on September 23 at 10:20 AM for your fasting physical exam.

## 2021-06-09 NOTE — TELEPHONE ENCOUNTER
Refill Approved    Rx renewed per Medication Renewal Policy. Medication was last renewed on 4/22/20.    Qi Rivera, [facility] Triage/Med Refill 7/22/2020     Requested Prescriptions   Pending Prescriptions Disp Refills     pravastatin (PRAVACHOL) 20 MG tablet [Pharmacy Med Name: PRAVASTATIN 20MG TABLETS] 90 tablet 0     Sig: TAKE 1 TABLET(20 MG) BY MOUTH DAILY       Statins Refill Protocol (Hmg CoA Reductase Inhibitors) Passed - 7/21/2020  3:17 PM        Passed - PCP or prescribing provider visit in past 12 months      Last office visit with prescriber/PCP: 4/29/2020 José Miguel Sheppard MD OR same dept: 4/29/2020 José Miguel Sheppard MD OR same specialty: 4/29/2020 José Miguel Sheppard MD  Last physical: 12/12/2017 Last MTM visit: Visit date not found   Next visit within 3 mo: Visit date not found  Next physical within 3 mo: Visit date not found  Prescriber OR PCP: José Miguel Sheppard MD  Last diagnosis associated with med order: 1. Hypercholesterolemia  - pravastatin (PRAVACHOL) 20 MG tablet [Pharmacy Med Name: PRAVASTATIN 20MG TABLETS]; TAKE 1 TABLET(20 MG) BY MOUTH DAILY  Dispense: 90 tablet; Refill: 0    2. Essential hypertension  - amLODIPine (NORVASC) 2.5 MG tablet [Pharmacy Med Name: AMLODIPINE BESYLATE 2.5MG TABLETS]; TAKE 1 TABLET(2.5 MG) BY MOUTH DAILY  Dispense: 90 tablet; Refill: 0    If protocol passes may refill for 12 months if within 3 months of last provider visit (or a total of 15 months).                amLODIPine (NORVASC) 2.5 MG tablet [Pharmacy Med Name: AMLODIPINE BESYLATE 2.5MG TABLETS] 90 tablet 0     Sig: TAKE 1 TABLET(2.5 MG) BY MOUTH DAILY       Calcium-Channel Blockers Protocol Passed - 7/21/2020  3:17 PM        Passed - PCP or prescribing provider visit in past 12 months or next 3 months     Last office visit with prescriber/PCP: 4/29/2020 José Miguel Sheppard MD OR same dept: 4/29/2020 José Miguel Sheppard MD OR same specialty: 4/29/2020 José Miguel Sheppard MD  Last physical: 12/12/2017 Last MTM visit: Visit  date not found   Next visit within 3 mo: Visit date not found  Next physical within 3 mo: Visit date not found  Prescriber OR PCP: José Miguel Sheppard MD  Last diagnosis associated with med order: 1. Hypercholesterolemia  - pravastatin (PRAVACHOL) 20 MG tablet [Pharmacy Med Name: PRAVASTATIN 20MG TABLETS]; TAKE 1 TABLET(20 MG) BY MOUTH DAILY  Dispense: 90 tablet; Refill: 0    2. Essential hypertension  - amLODIPine (NORVASC) 2.5 MG tablet [Pharmacy Med Name: AMLODIPINE BESYLATE 2.5MG TABLETS]; TAKE 1 TABLET(2.5 MG) BY MOUTH DAILY  Dispense: 90 tablet; Refill: 0    If protocol passes may refill for 12 months if within 3 months of last provider visit (or a total of 15 months).             Passed - Blood pressure filed in past 12 months     BP Readings from Last 1 Encounters:   04/29/20 150/80

## 2021-06-09 NOTE — TELEPHONE ENCOUNTER
Medication Question or Clarification  Who is calling: Patient  What medication are you calling about (include dose and sig)?:   Disp  Refills  Start  End      amLODIPine (NORVASC) 2.5 MG tablet  90 tablet  0  4/22/2020      Sig: TAKE 1 TABLET(2.5 MG) BY MOUTH DAILY     Sent to pharmacy as: amLODIPine 2.5 mg tablet (NORVASC)     Notes to Pharmacy: Patient is overdue for clinic follow-up     E-Prescribing Status: Receipt confirmed by pharmacy (4/22/2020 10:13 AM CDT)       Who prescribed the medication?: José Miguel Sheppard MD   What is your question/concern?: Patient stated José Miguel Sheppard MD told her to try doubling up on her dose and take 5 mg per day to see if this helped her blood pressure. Patient stated her blood pressure has been doing well and staying in the 116s/60s. Patient stated she needs a new Rx for the 5 mg dose of amlodipine.  Requested Pharmacy: [facility]ana [facility] to leave a detailed message?: No

## 2021-06-10 NOTE — TELEPHONE ENCOUNTER
Refill Approved    Rx renewed per Medication Renewal Policy. Medication was last renewed on 8/26/19.    Qi Rivera, [facility] Triage/Med Refill 8/17/2020     Requested Prescriptions   Pending Prescriptions Disp Refills     propranoloL (INDERAL LA) 80 mg 24 hr capsule [Pharmacy Med Name: PROPRANOLOL ER 80MG CAPSULES] 90 capsule 3     Sig: TAKE 1 CAPSULE(80 MG) BY MOUTH DAILY       Beta-Blockers Refill Protocol Passed - 8/16/2020 11:07 PM        Passed - PCP or prescribing provider visit in past 12 months or next 3 months     Last office visit with prescriber/PCP: 4/29/2020 José Miguel Sheppard MD OR same dept: Visit date not found OR same specialty: 4/29/2020 José Miguel Sheppard MD  Last physical: 12/12/2017 Last MTM visit: Visit date not found   Next visit within 3 mo: Visit date not found  Next physical within 3 mo: Visit date not found  Prescriber OR PCP: José Miguel Sheppard MD  Last diagnosis associated with med order: 1. Essential hypertension  - propranoloL (INDERAL LA) 80 mg 24 hr capsule [Pharmacy Med Name: PROPRANOLOL ER 80MG CAPSULES]; TAKE 1 CAPSULE(80 MG) BY MOUTH DAILY  Dispense: 90 capsule; Refill: 3    If protocol passes may refill for 12 months if within 3 months of last provider visit (or a total of 15 months).             Passed - Blood pressure filed in past 12 months     BP Readings from Last 1 Encounters:   04/29/20 150/80

## 2021-06-13 NOTE — PROGRESS NOTES
[facility] Follow Up Note    Shanna Coto   71 y.o. female    Date of Visit: 10/16/2017    Chief Complaint   Patient presents with     Pre-op Exam     Rt Cataract, on 10/23,@ [place], Dr Mayfield     Subjective  Shanna is here for a preop prior to right eye cataract surgery.    Here to also follow-up for hypertension and history of neck and body tremor, for which she takes Inderal LA 80 mg a day.    Also on amlodipine 2.5 mg a day for hypertension.    Her hypertension is well controlled, denies lightheaded dizzy spells.  She got a new wrist cough to check blood pressure and sometimes she is getting some lower blood pressures, but denies lightheaded dizzy spells.    Patient has had previous left cataract surgery including vitrectomy and membrane peel on the left.  Also YAG laser on the left.    Previous right eye posterior retinal detachment.  She has a +2 cataract with some difficulty in vision, difficulty reading for over 6 months.    Patient symptoms are currently stable over the past month.  No eye pain or drainage.  Normal eye pressures reported at last vision check.    Patient plans to proceed with right eye cataract removal.    She does not have a cough or respiratory illness.  No fever.  No sinusitis symptoms.    She has never smoked.    No chest pain or chest pressure.  No palpitations or history of arrhythmia.  No syncope history.    No history of asthma.    No history of DVT.    He does have hypercholesterolemia and is well controlled on Pravachol.    PMHx:  No past medical history on file.  PSHx:    Past Surgical History:   Procedure Laterality Date     PA REDUCTION OF LARGE BREAST      Description: Breast Surgery Enlargement Procedure Elective;  Recorded: 12/18/2009;  Comments: naveed     Immunizations:   Immunization History   Administered Date(s) Administered     Influenza [id number], 12/17/2009     Influenza high dose, seasonal 12/18/2015     Influenza, inj, historic  "10/22/2008, 12/17/2009, 10/28/2010     Influenza, seasonal,quad inj 6-35 mos 11/12/2013, 10/10/2014     Pneumo Polysac 23-V 12/10/2012     Td, historic 06/17/2008     Tdap 06/17/2008       ROS A comprehensive review of systems was performed and was otherwise negative    Medications, allergies, and problem list were reviewed and updated    Exam  /82  Pulse (!) 56  Ht 5' 8\" (1.727 m)  Wt 164 lb (74.4 kg)  SpO2 99%  BMI 24.94 kg/m2  Pupils and irises equal and reactive.  No jaundice or conjunctivitis.  Periorbital tissues are normal.  Extraocular muscles intact.  Pharynx is normal.  Teeth in good condition.  No JVD and no carotid bruits.  No cervical or supraclavicular adenopathy.  Lungs are clear to auscultation and heart is regular without murmur.  No ankle edema.  Abdomen is nontender no hepatosplenomegaly.    ECG read by me is sinus rhythm, normal EKG, just borderline low heart rate approximately 60 which is baseline with her beta-blocker.  No change from 2014 EKG      Assessment/Plan  1. Pre-op exam   patient given surgical clearance to proceed with outpatient right cataract as planned.    She did not have any further questions regarding the surgery itself or risks involved with surgery.  She has undergone it in the past.  Her graph she understands the importance of taking eyedrops as prescribed and follow-up as recommended with ophthalmology.  - Electrocardiogram Perform and Read,  Patient was told if she develops a cough or URI symptoms to get reevaluated prior to proceeding with the cataract surgery.      2. Cataract of right eye, unspecified cataract type  Plan as above    3. Essential hypertension  Controlled, she takes her amlodipine and Inderal in the evening    4. Hypercholesterolemia  Has been controlled on pravastatin.  He does not tolerate other statins.    5. Medication monitoring encounter    - Comprehensive Metabolic Panel  - HM2(CBC w/o Differential)    6. Osteopenia, unspecified " location  Not currently receiving treatment.  Could consider treatment if progresses to full osteoporosis.  Continue regular walking exercise.  - DXA Bone Density Scan; Future    7. Post-menopausal  - DXA Bone Density Scan; Future    Flu shot was given today.  I did recommend patient consider Prevnar 13 later this year, she can get that on her own or return to clinic.    She was having some knee and hip osteoarthritis type pain.  He did not feel was severe enough to see orthopedic clinic, but follow-up as needed.  She does take Aleve about 4 days out of the week in the evening.    Her 10 year colonoscopy is coming due and she will contact gastroenterology to set that up.    No further Paps planned because of age, previous normals.  Has organs in.    Mammogram November 2016 was negative.    Patient has a full body tremor, stable.  On Inderal LA.  She will take that evening before surgery.    Return in about 6 months (around 4/16/2018) for Recheck.   Patient Instructions   Lab work today.    Proceed as planned with cataract surgery.    Consider Prevnar 13 pneumonia vaccine at a future time, can get a local pharmacy.    Can get Zostavax shingles vaccine at local pharmacy in the future, as well.    Flu shot was given today.    Colonoscopy planned for later this year.    Schedule bone density this fall.    Let me know if any issues with blood pressure, especially if lightheaded dizzy spells.  Otherwise, follow-up in 6 months for routine checkup.    José Miguel Sheppard MD  Total time with patient over 25 minutes and over 50% coord care.  Time all face to face.      Current Outpatient Prescriptions   Medication Sig Dispense Refill     amLODIPine (NORVASC) 2.5 MG tablet TAKE 1 TABLET BY MOUTH EVERY DAY 90 tablet 2     fluticasone (FLONASE) 50 mcg/actuation nasal spray USE TWO SPRAYS IN EACH NOSTRIL DAILY 48 g 2     pravastatin (PRAVACHOL) 20 MG tablet TAKE 1 TABLET BY MOUTH DAILY 90 tablet 3     propranolol (INDERAL LA) 80 mg 24  hr capsule TAKE 1 CAPSULE BY MOUTH EVERY DAY 90 capsule 3     No current facility-administered medications for this visit.      Allergies   Allergen Reactions     Ampicillin      Social History   Substance Use Topics     Smoking status: Never Smoker     Smokeless tobacco: None     Alcohol use None

## 2021-06-14 NOTE — PROGRESS NOTES
[facility] Follow Up Note    Shanna Coto   71 y.o. female    Date of Visit: 12/12/2017    Chief Complaint   Patient presents with     Pre-op Exam     Rt eye ERM surg, on 12/20, Dr Peace, @ [facility]     Subjective  Shanna is here for a preop for right retinal membrane peel.  She had blurred vision develop after right eye cataract surgery in October.    Past history of right eye posterior retinal detachment.    She had a retinal membrane peel of her left eye in the past, subsequent left cataract surgery and YAG laser on the left.    She did tolerate cataract surgery otherwise well.  She does have a full-body essential tremor syndrome, but fairly well-controlled on Inderal which she takes at night.    Hypertension is been well-controlled in the past on Inderal and amlodipine.  Blood pressure mildly high today.  She denies lightheaded dizzy spells.  No edema.    Remains active with regular walking.  Her hip and knee pain is somewhat less, occasional Aleve.    No chest pain or chest pressure.  No palpitations or history of arrhythmia.    Non-smoker, never smoked.  No history of asthma.    She did develop a mild cough consistent with a URI November 28, it is much better now.  She does have some mild postnasal drip with a tickle in her throat.  She does not believe the cough will interfere with surgery.  No sinus pain or purulent discharge or ear pain.  She has used Flonase in the past, but needs a refill.    Past history of hypercholesterolemia, controlled on pravastatin.    No UTI type symptoms or dysuria.  No abdominal pain or change in bowels or diarrhea.  He has been eating normally.    PMHx:  No past medical history on file.  PSHx:    Past Surgical History:   Procedure Laterality Date     NC REDUCTION OF LARGE BREAST      Description: Breast Surgery Enlargement Procedure Elective;  Recorded: 12/18/2009;  Comments: silacone     Immunizations:   Immunization History  "  Administered Date(s) Administered     Influenza [id number], 12/17/2009     Influenza high dose, seasonal 12/18/2015, 10/16/2017     Influenza, inj, historic,unspecified 10/22/2008, 12/17/2009, 10/28/2010     Influenza, seasonal,quad inj 6-35 mos 11/12/2013, 10/10/2014     Pneumo Polysac 23-V 12/10/2012     Td,adult,historic,unspecified 06/17/2008     Tdap 06/17/2008       ROS A comprehensive review of systems was performed and was otherwise negative    Medications, allergies, and problem list were reviewed and updated    Exam  /70  Pulse (!) 48  Ht 5' 8\" (1.727 m)  Wt 162 lb (73.5 kg)  SpO2 99%  BMI 24.63 kg/m2  Healthy-appearing female.  Very mild essential tremor.  Pupils and irises equal and reactive.  No conjunctivitis or periorbital formation.  Extraocular muscles movements intact.  Pharynx has mild diffuse cobblestoning with nonexudative pharyngitis.  No cervical or supraclavicular adenopathy.  No JVD and no carotid bruits.  Lungs clear to auscultation with normal respiratory excursion.  Heart is regular without murmur.  Abdomen is nondistended.  No ankle edema.    ECG October 16, 2017 is normal sinus bradycardia, nonspecific ST-T wave abnormality, but otherwise appears normal.    Lab work done in October shows normal creatinine of 0.75, normal liver tests, normal blood sugar, normal hemoglobin of 14.2.    Assessment/Plan  1. Preop examination  Patient clinically stable to proceed as planned with outpatient ophthalmologic procedure.  Patient was warned that if her cough worsens, to reevaluate scheduling of the procedure.    Likely viral URI, essentially resolved symptoms now.    She is low cardiovascular risk with good exercise tolerance and this is a low risk surgery.    Patient accepts risk of ophthalmologic complications, follow-up with ophthalmology as planned.  I stressed the importance of taking eyedrops as directed.    2. Blurry vision, right eye  Plan as above    3. Essential " hypertension  Continue current medications.  Take medications as usual evening before.    Full body tremor, stable and mild.    4. Chronic non-seasonal allergic rhinitis, unspecified trigger  Some continued postnasal drip.  Restart Flonase.   (FLONASE) 50 mcg/actuation nasal spray; USE TWO SPRAYS IN EACH NOSTRIL DAILY  Dispense: 48 g; Refill: 2    Overdue for her 10 year colonoscopy, she was reminded to set that up.    She is reminded to set up her DEXA scan this winter.    Hypercholesterolemia on pravastatin.    Follow-up in the spring for adult wellness visit, fasting.    She is due for the Prevnar 13.    Return in about 6 months (around 6/12/2018) for Recheck.   Patient Instructions   Proceed as planned with eye procedure.    No change in medications.    Flonase 2 sprays each nostril once a day can reduce postnasal drip type cough irritation.    You are due for your 10 year screening colonoscopy, call [place] [facility] to set that up.    Consider setting up a bone density, DEXA scan this winter or spring.    Routine fasting follow-up with me in the spring in 4-6 months.    José Miguel Sheppard MD  Total time with patient over 25 minutes and over 50% coord care.  Time all face to face.      Current Outpatient Prescriptions   Medication Sig Dispense Refill     amLODIPine (NORVASC) 2.5 MG tablet Take 1 tablet (2.5 mg total) by mouth daily. 90 tablet 3     ketorolac (ACULAR LS) 0.4 % Drop INT 1 GTT IN OD QID  4     pravastatin (PRAVACHOL) 20 MG tablet TAKE 1 TABLET BY MOUTH DAILY 90 tablet 3     prednisoLONE acetate (PRED-FORTE) 1 % ophthalmic suspension SHAKE LQ AND INT 1 GTT IN OD FOUR TIMES DAILY. START 1 DAY B SURGERY AND U UNTIL INSTRUCTED TO DISCONTINUE  1     propranolol (INDERAL LA) 80 mg 24 hr capsule TAKE 1 CAPSULE BY MOUTH EVERY DAY 90 capsule 3     fluticasone (FLONASE) 50 mcg/actuation nasal spray USE TWO SPRAYS IN EACH NOSTRIL DAILY 48 g 2     No current facility-administered medications for this  visit.      Allergies   Allergen Reactions     Ampicillin      Social History   Substance Use Topics     Smoking status: Never Smoker     Smokeless tobacco: None     Alcohol use None

## 2021-06-14 NOTE — TELEPHONE ENCOUNTER
Refill request for medication: pravastatin  Last visit addressing this medication: 5/15/20  Follow up plan 4  months  Last refill on 7/22/20, quantity #90   CSA completed Not applicable   checked Not applicable    Appointment: Pt not scheduled yet     Vicky Payan, [facility]

## 2021-06-15 ENCOUNTER — COMMUNICATION - HEALTHEAST (OUTPATIENT)
Dept: INTERNAL MEDICINE | Facility: CLINIC | Age: 76
End: 2021-06-15

## 2021-06-15 DIAGNOSIS — I10 ESSENTIAL HYPERTENSION: ICD-10-CM

## 2021-06-15 RX ORDER — AMLODIPINE BESYLATE 5 MG/1
TABLET ORAL
Qty: 90 TABLET | Refills: 3 | Status: SHIPPED | OUTPATIENT
Start: 2021-06-15 | End: 2022-04-21

## 2021-06-18 NOTE — PROGRESS NOTES
[facility] clinic Follow Up Note    Shanna Coto   72 y.o. female    Date of Visit: 6/15/2018    Chief Complaint   Patient presents with     Follow-up     skin irritant Rt side.      Subjective  Shanna today is presenting with a new, undiagnosed problem.    He has a past history of hypertension is been controlled on Inderal and amlodipine.    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  She has not yet ready for joint replacement.    Full-body essential tremor stable.    Hypercholesterolemia on pravastatin.  She has not had muscle ache issues with that in the past.  Cholesterol was well-controlled back in 2016.    No chest pain or cardiac event.  2010 MRA was negative for carotid artery stenosis.    She has never smoked.  No history of asthma.    She had some cataract and eye procedures last year.    Otherwise she has been in her normal state of health.  She did spend last winter in [place].    She had sudden onset of right sided lower chest wall and back burning pain 9 days ago.  It felt like a bad sunburn.  At first she was having difficulty localizing it and thought it may have been bilateral lower back.  She does feel it has settled in her right lower costal area in the T8-T11 area on the right chest wall.  It does not extend into the abdomen or flank or lower abdomen.  There is no midline back pain.    No rash developed.    She did state it felt like a severe sunburn and she did take some Aleve last week.  It has gotten significantly better this week and has trended 50% better during the week.  Mild tenderness now.    No fevers no cough no shortness of breath.  No change in bowels or urinary symptoms.    No recent travel or abnormal activity.  No tick bites or insect bites.  No rash of any type.    She has had continued knee and hip pain, did get somewhat better after the cortisone shot.  She has not had any acute worsening  "of arthritis type pain and no significant hand arthritis type complaints.    She has not noticed any rash in her eyelids or on her knuckles.    No history of autoimmune disease.    Patient states she has had a burning skin feeling like this in the past intermittently on the backs of her arms and other areas that never goes into a rash.  Usually lasts a few weeks and then resolves.  She states she has had that for over 10 years about every 3 months.  She has not mentioned in the past.    She has an area of numbness on her right thigh that she has had over 30 years that stable.    She does have silica and breast implants that she has had for many decades.  They are not giving her any trouble.    PMHx:  No past medical history on file.  PSHx:    Past Surgical History:   Procedure Laterality Date     CT REDUCTION OF LARGE BREAST      Description: Breast Surgery Enlargement Procedure Elective;  Recorded: 12/18/2009;  Comments: naveed     Immunizations:   Immunization History   Administered Date(s) Administered     Influenza [id number], 12/17/2009     Influenza high dose, seasonal 12/18/2015, 10/16/2017     Influenza, inj, historic,unspecified 10/22/2008, 12/17/2009, 10/28/2010     Influenza, seasonal,quad inj 6-35 mos 11/12/2013, 10/10/2014     Pneumo Polysac 23-V 12/10/2012     Td,adult,historic,unspecified 06/17/2008     Tdap 06/17/2008       ROS A comprehensive review of systems was performed and was otherwise negative    Medications, allergies, and problem list were reviewed and updated    Exam  /88  Pulse 60  Ht 5' 8\" (1.727 m)  Wt 164 lb (74.4 kg)  SpO2 96%  BMI 24.94 kg/m2  Alert and oriented ×3.  Appears well.  No conjunctivitis.  Pupils and irises are equal and reactive.  No pharyngitis or other oral lesions.  Teeth in good condition.  No cervical or supra clavicular adenopathy.  No thyromegaly.  Lungs are clear to auscultation with normal respiratory excursion.  Chest wall appears normal.  She does " have tenderness along her ribs from the eighth to 11th ribs to probe palpation.  There is some mild tenderness along the lower rib margin on the left as well, that is more mild.  There is no crepitus.  Chest wall is stable.  Skin appears normal.  There is no significant right upper quadrant pain to palpation in the abdomen itself no point tenderness when palpating liver.  No lower abdominal tenderness.  Heart is regular without murmur rub or gallop.  No lower extremity edema.  Arms appear normal.  Skin on her eyelids and hands are normal.    Assessment/Plan  1. Right-sided chest wall pain  She may have had a chest wall strain that is now improving.    Possibility for shingles without rash.    Does not appear to be abdominal visceral in nature.    I discussed option for imaging.  Her symptoms are 50% improved and trending better with otherwise normal exam except for some mild chest wall pain today, I will have her hold off on imaging at this time.  She was told to follow-up immediately for any worsening symptoms or changing symptoms or if not better in 2 weeks.    Could consider chest and abdomen CT imaging.    Low suspicion for dermatomyositis without classic symptoms or findings.  Check a CK.  - Comprehensive Metabolic Panel  - HM1(CBC and Differential)  - CK Total  - HM1 (CBC with Diff)    2. Essential hypertension  Borderline controlled.  Inderal and amlodipine.  Follow-up later this summer for health in his physical exam.    3. Hypercholesterolemia  On pravastatin, check CK  - Lipid Cascade    4. Medication monitoring encounter    - Comprehensive Metabolic Panel  - HM1(CBC and Differential)  - CK Total  - HM1 (CBC with Diff)    Reviewed that she is overdue for multiple health maintenance issues.  She did not want to initiate that at this time.  I strongly encouraged her to return later this summer for adult wellness visit physical exam and address issues as listed below.    Whole-body essential tremor stable on  Inderal.    Return in about 2 months (around 8/15/2018) for Annual physical.   Patient Instructions   Lab work today.  Results will be mailed to you.    Anticipate slow improvement over the next 2-3 weeks.  If your symptoms worsen, or if you do develop a rash or different type of symptom, seek medical attention immediately at that time.    See me later this summer for a health maintenance physical exam.    You are due for your colonoscopy and mammogram and bone density test.    You are due for a tetanus shot and Prevnar 13 pneumonia vaccine.    José Miguel Sheppard MD  Shanna today is presenting with a new, undiagnosed problem.          Current Outpatient Prescriptions   Medication Sig Dispense Refill     amLODIPine (NORVASC) 2.5 MG tablet Take 1 tablet (2.5 mg total) by mouth daily. 90 tablet 3     ketorolac (ACULAR LS) 0.4 % Drop INT 1 GTT IN OD QID  4     pravastatin (PRAVACHOL) 20 MG tablet TAKE 1 TABLET BY MOUTH DAILY 90 tablet 3     propranolol (INDERAL LA) 80 mg 24 hr capsule TAKE 1 CAPSULE BY MOUTH EVERY DAY 90 capsule 3     fluticasone (FLONASE) 50 mcg/actuation nasal spray USE TWO SPRAYS IN EACH NOSTRIL DAILY 48 g 2     No current facility-administered medications for this visit.      Allergies   Allergen Reactions     Ampicillin      Penicillins Rash     Social History   Substance Use Topics     Smoking status: Never Smoker     Smokeless tobacco: Not on file     Alcohol use Not on file

## 2021-06-20 NOTE — LETTER
Letter by José Miguel Sheppard MD at      Author: José Miguel Sheppard MD Service: -- Author Type: --    Filed:  Encounter Date: 4/30/2020 Status: (Other)         Shanna Coto  [address] [facility] [address]             April 30, 2020         Dear Ms. Coto,    Below are the results from your recent visit:    Resulted Orders   Comprehensive Metabolic Panel   Result Value Ref Range    Sodium 142 136 - 145 mmol/L    Potassium 4.1 3.5 - 5.0 mmol/L    Chloride 104 98 - 107 mmol/L    CO2 28 22 - 31 mmol/L    Anion Gap, Calculation 10 5 - 18 mmol/L    Glucose 83 70 - 125 mg/dL    BUN 15 8 - 28 mg/dL    Creatinine 0.79 0.60 - 1.10 mg/dL    GFR MDRD Af Amer >60 >60 mL/min/1.73m2    GFR MDRD Non Af Amer >60 >60 mL/min/1.73m2    Bilirubin, Total 0.6 0.0 - 1.0 mg/dL    Calcium 9.0 8.5 - 10.5 mg/dL    Protein, Total 6.7 6.0 - 8.0 g/dL    Albumin 3.9 3.5 - 5.0 g/dL    Alkaline Phosphatase 65 45 - 120 U/L    AST 16 0 - 40 U/L    ALT 13 0 - 45 U/L    Narrative    Fasting Glucose reference range is 70-99 mg/dL per  American Diabetes Association (ADA) guidelines.   Lipid Cascade   Result Value Ref Range    Cholesterol 222 (H) <=199 mg/dL    Triglycerides 103 <=149 mg/dL    HDL Cholesterol 64 >=50 mg/dL    LDL Calculated 137 (H) <=129 mg/dL    Patient Fasting > 8hrs? Yes        Kidney and liver tests are normal.  Blood sugar is normal.    LDL cholesterol level is significantly better than previous.  LDL cholesterol is just borderline high.  Continue current pravastatin.    No change in treatment plan.    Please call with questions or contact us using [employer].    Sincerely,        Electronically signed by José Miguel Sheppard MD

## 2021-06-25 NOTE — TELEPHONE ENCOUNTER
RN cannot approve Refill Request    RN can NOT refill this medication Protocol failed and NO refill given. Last office visit: Visit date not found Last Physical: Visit date not found Last MTM visit: Visit date not found Last visit same specialty: 4/29/2020 José Miguel Sheppard MD.  Next visit within 3 mo: Visit date not found  Next physical within 3 mo: Visit date not found      [address], [facility] Triage/Med Refill 6/15/2021    Requested Prescriptions   Pending Prescriptions Disp Refills     amLODIPine (NORVASC) 5 MG tablet [Pharmacy Med Name: AMLODIPINE BESYLATE 5MG TABLETS] 90 tablet 3     Sig: TAKE 1 TABLET(5 MG) BY MOUTH DAILY       Calcium-Channel Blockers Protocol Failed - 6/15/2021 10:04 AM        Failed - PCP or prescribing provider visit in past 12 months or next 3 months     Last office visit with prescriber/PCP: Visit date not found OR same dept: Visit date not found OR same specialty: 4/29/2020 José Miguel Sheppard MD  Last physical: Visit date not found Last MTM visit: Visit date not found   Next visit within 3 mo: Visit date not found  Next physical within 3 mo: Visit date not found  Prescriber OR PCP: Anshul Alejandro MD  Last diagnosis associated with med order: 1. Essential hypertension  - amLODIPine (NORVASC) 5 MG tablet [Pharmacy Med Name: AMLODIPINE BESYLATE 5MG TABLETS]; TAKE 1 TABLET(5 MG) BY MOUTH DAILY  Dispense: 90 tablet; Refill: 3    If protocol passes may refill for 12 months if within 3 months of last provider visit (or a total of 15 months).             Failed - Blood pressure filed in past 12 months     BP Readings from Last 1 Encounters:   04/29/20 150/80

## 2021-07-27 ENCOUNTER — TRANSFERRED RECORDS (OUTPATIENT)
Dept: HEALTH INFORMATION MANAGEMENT | Facility: CLINIC | Age: 76
End: 2021-07-27

## 2021-10-11 ENCOUNTER — HEALTH MAINTENANCE LETTER (OUTPATIENT)
Age: 76
End: 2021-10-11

## 2021-12-27 ENCOUNTER — OFFICE VISIT (OUTPATIENT)
Dept: INTERNAL MEDICINE | Facility: CLINIC | Age: 76
End: 2021-12-27
Payer: COMMERCIAL

## 2021-12-27 VITALS
OXYGEN SATURATION: 98 % | WEIGHT: 156 LBS | SYSTOLIC BLOOD PRESSURE: 122 MMHG | HEART RATE: 59 BPM | DIASTOLIC BLOOD PRESSURE: 82 MMHG | HEIGHT: 68 IN | BODY MASS INDEX: 23.64 KG/M2

## 2021-12-27 DIAGNOSIS — F41.9 ANXIETY: ICD-10-CM

## 2021-12-27 DIAGNOSIS — G25.0 ESSENTIAL TREMOR: ICD-10-CM

## 2021-12-27 DIAGNOSIS — E78.00 HYPERCHOLESTEROLEMIA: ICD-10-CM

## 2021-12-27 DIAGNOSIS — I10 ESSENTIAL HYPERTENSION: Primary | ICD-10-CM

## 2021-12-27 PROCEDURE — 99214 OFFICE O/P EST MOD 30 MIN: CPT | Performed by: INTERNAL MEDICINE

## 2021-12-27 RX ORDER — MULTIVIT-MIN/IRON/FOLIC ACID/K 18-600-40
2 CAPSULE ORAL DAILY
COMMUNITY

## 2021-12-27 ASSESSMENT — MIFFLIN-ST. JEOR: SCORE: 1246.11

## 2021-12-27 NOTE — PATIENT INSTRUCTIONS
You could consider sertraline or Lexapro or citalopram type medications.  These are selective serotonin reuptake inhibitor type medications which can be used long-term to help control anxiety.  Schedule a telephone call with me in 2 to 3 weeks to discuss possibly starting on 1 of these medications.  You have also been referred to mental health provider to discuss your anxiety issue further.    I would recommend reducing caffeine and increasing daily physical activity.  20 minutes of aerobic type activity such as a Curves video on YouTube could be considered.    Goal blood pressure less than 135/85.  If you are having blood pressures less than 110/60 with increasing lightheadedness, you could consider reducing your amlodipine dose down to 2.5 mg a day.    Follow-up in clinic to reevaluate any worsening dizziness or other new symptoms.  Otherwise see me in the spring for physical exam with fasting labs.

## 2021-12-27 NOTE — PROGRESS NOTES
[facility] clinic Follow Up Note    Shanna Coto   76 year old female    Date of Visit: 12/27/2021    Chief Complaint   Patient presents with     Hypertension     BP up and down, seems to be going up     Tremors     head tremor seems to be increasing, bigger movements     Anxiety     Anxiety much worse, hard to drive in car, affecting her life     Ear Problem     Check left ear for wax     Subjective  Shanna is a 76-year-old female who made appointment earlier than planned because of increasing anxiety this fall.  She has had lifelong anxiety but complains of worse anxiety over the past 3 to 4 months.    She does have a good [place] of friends and communicates on a regular basis, usually a phone call every morning.  She does work at [employer] 3 times a week and is quite busy at work and does not feel anxiety at work.  She has not been doing regular exercise since the summer with the weather changing but was quite active this summer and felt good with that physical activity.    She stopped taking the Inderal for essential tremor because of lower heart rate and fatigue.    She has a mild essential tremor of her neck, very mild of her hands.  Its not significantly worse.    She does admit to drinking some caffeine in the morning.  No significant alcohol.    Her blood pressure has been variable from 119//72.  Recently somewhat higher blood pressures.  No edema.  No chest pain or palpitations or shortness of breath with activity.    Still on amlodipine 5 mg a day which was increased a year ago.    No generalized myalgias on low-dose pravastatin 20 mg.  No chest pain or other cardiac symptoms.  She has not had previous cardiac work-up.  2010 MRA was negative for coronary stenosis.  She is never smoked.    No abdominal pain complaints.  She still due for Cologuard colon cancer screening.    Patient complains of some left ear congestion feeling intermittently, usually when she is on the phone for more than  "15 minutes.  She feels somewhat unsteady after being on the phone for more than 15 minutes.  She gets up and stretches and generally that resolves quickly.  It just happens when she is on the phone in the morning with her friends.  No other neurologic changes.    PMHx:  No past medical history on file.  PSHx:    Past Surgical History:   Procedure Laterality Date     HC REDUCTION OF LARGE BREAST      Description: Breast Surgery Enlargement Procedure Elective;  Recorded: 12/18/2009;  Comments: naveed     Immunizations:   Immunization History   Administered Date(s) Administered     COVID-19,PF,Pfizer (12+ Yrs) 02/11/2021, 03/04/2021, 11/18/2021     FLUAD(HD)65+ QUAD 11/18/2020, 11/18/2021     Flu 65+ Years 11/13/2019     Flu, Unspecified 10/22/2008, 12/17/2009, 10/28/2010     Influenza (H1N1) 12/17/2009     Influenza (High Dose) 3 valent vaccine 12/18/2015, 10/31/2016, 10/16/2017     Influenza (IIV3) PF 10/22/2008, 10/28/2010, 10/31/2011, 11/28/2012, 11/12/2013, 10/10/2014     Influenza Vaccine, 6+MO IM (QUADRIVALENT W/PRESERVATIVES) 11/12/2013, 10/10/2014     Pneumococcal 23 valent 12/10/2012     Td,adult,historic,unspecified 06/17/2008     Tdap (Adacel,Boostrix) 06/17/2008       ROS A comprehensive review of systems was performed and was otherwise negative    Medications, allergies, and problem list were reviewed and updated    Exam  /82 (BP Location: Right arm, Patient Position: Sitting, Cuff Size: Adult Regular)   Pulse 59   Ht 1.727 m (5' 8\")   Wt 70.8 kg (156 lb)   SpO2 98%   BMI 23.72 kg/m    Alert and oriented x3.  Good mood and affect.  Appears normal neurologically except for mild essential head bobbing consistent with essential tremor that she has had lifelong.  Minimal hand essential tremor.  Nonfocal neurologic exam.  Pupils and irises equal and reactive.  Voice quality normal.  Pupils and irises equal and reactive and extraocular muscles intact.  Her external ears and nose exam is normal and " tympanic membranes appear normal.  No carotid bruits.  No cervical or supraclavicular adenopathy or JVD.  Lungs are clear to auscultation and heart is regular without murmur.  Abdomen is nonobese nontender no edema.  Gait normal.    Assessment/Plan  1. Essential hypertension  Controlled today.  Borderline high blood pressures at home likely with increasing stress or less activity.  Continue on current amlodipine 5 mg a day.  She is off the Inderal LA 80 mg a day because of previous lower heart rates.    To continue to follow blood pressure, see patient instructions.  Follow-up in the spring if blood pressure otherwise stable.    Increase regular physical activity recommended.    2. Hypercholesterolemia  Pravastatin 20 mg.  No evidence of toxicity.  Patient declined lab work at this time.  Plan follow-up in the spring for fasting labs.  Consider cardiac work-up with cardiac CT scan in the spring.    3. Anxiety  Chronic.  Escalating recently with situation, likely with Covid outbreak.  Continue to communicate regularly with her friends and stay connected with others.  I recommended increasing daily physical activity.  I recommended reducing caffeine.    She has been referred to mental health services to discuss anxiety further.  We did discuss option for SSRI with such as Lexapro or sertraline.  She will consider such medications.  Did warn her on possible risk of medications including worsening anxiety as she initiates that.    I did not recommend benzodiazepines with risk of addiction and rebound anxiety.    She will schedule a telephone follow-up with me in a few weeks to discuss her anxiety and decide if she wants to start on a low-dose SSRI.  - Adult Mental Health Referral; Future    4. Essential tremor  Mild.  Not significantly worse off the propranolol.  Worsening fatigue and low heart rate with propranolol.  We did discuss other medications that the seizure medications were with sedating side effects which she  declined.  She is already seen a neurologist previously and did not want to go back to neurology.    Follow-up in the spring for health maintenance.    Unclear symptoms as she is on the phone more than 15 minutes to get somewhat unsteady.  Possibly associated with her anxiety.  Possibly associated with dizziness with sitting still in the morning.  She was told to follow-up for reevaluation if any escalating symptoms.      Return in about 2 weeks (around 1/10/2022) for Telephone or video virtual follow-up.   Patient Instructions   You could consider sertraline or Lexapro or citalopram type medications.  These are selective serotonin reuptake inhibitor type medications which can be used long-term to help control anxiety.  Schedule a telephone call with me in 2 to 3 weeks to discuss possibly starting on 1 of these medications.  You have also been referred to mental health provider to discuss your anxiety issue further.    I would recommend reducing caffeine and increasing daily physical activity.  20 minutes of aerobic type activity such as a Curves video on [employer] could be considered.    Goal blood pressure less than 135/85.  If you are having blood pressures less than 110/60 with increasing lightheadedness, you could consider reducing your amlodipine dose down to 2.5 mg a day.    Follow-up in clinic to reevaluate any worsening dizziness or other new symptoms.  Otherwise see me in the spring for physical exam with fasting labs.    José Miguel Sheppard MD, MD        Current Outpatient Medications   Medication Sig Dispense Refill     amLODIPine (NORVASC) 5 MG tablet [AMLODIPINE (NORVASC) 5 MG TABLET] TAKE 1 TABLET(5 MG) BY MOUTH DAILY 90 tablet 3     pravastatin (PRAVACHOL) 20 MG tablet [PRAVASTATIN (PRAVACHOL) 20 MG TABLET] Take 1 tablet (20 mg total) by mouth daily. 90 tablet 2     Vitamin D, Cholecalciferol, 25 MCG (1000 UT) TABS Take 2 tablets by mouth daily       Allergies   Allergen Reactions     Ampicillin Unknown      Kali Aquino     Social History     Tobacco Use     Smoking status: Never Smoker     Smokeless tobacco: Never Used   Substance Use Topics     Alcohol use: Not on file     Drug use: Not on file

## 2022-01-20 DIAGNOSIS — E78.00 PURE HYPERCHOLESTEROLEMIA: ICD-10-CM

## 2022-01-21 RX ORDER — PRAVASTATIN SODIUM 20 MG
20 TABLET ORAL DAILY
Qty: 90 TABLET | Refills: 2 | Status: SHIPPED | OUTPATIENT
Start: 2022-01-21 | End: 2022-10-20

## 2022-04-14 ENCOUNTER — TRANSFERRED RECORDS (OUTPATIENT)
Dept: HEALTH INFORMATION MANAGEMENT | Facility: CLINIC | Age: 77
End: 2022-04-14
Payer: COMMERCIAL

## 2022-04-21 ENCOUNTER — OFFICE VISIT (OUTPATIENT)
Dept: INTERNAL MEDICINE | Facility: CLINIC | Age: 77
End: 2022-04-21
Payer: COMMERCIAL

## 2022-04-21 VITALS
DIASTOLIC BLOOD PRESSURE: 80 MMHG | OXYGEN SATURATION: 99 % | HEART RATE: 73 BPM | WEIGHT: 159 LBS | SYSTOLIC BLOOD PRESSURE: 126 MMHG | BODY MASS INDEX: 24.1 KG/M2 | HEIGHT: 68 IN

## 2022-04-21 DIAGNOSIS — G25.0 ESSENTIAL TREMOR: ICD-10-CM

## 2022-04-21 DIAGNOSIS — Z51.81 ENCOUNTER FOR THERAPEUTIC DRUG MONITORING: ICD-10-CM

## 2022-04-21 DIAGNOSIS — Z11.59 NEED FOR HEPATITIS C SCREENING TEST: ICD-10-CM

## 2022-04-21 DIAGNOSIS — E78.00 HYPERCHOLESTEROLEMIA: ICD-10-CM

## 2022-04-21 DIAGNOSIS — Z82.79 FAMILY HISTORY OF CONGENITAL HEART DISEASE IN FATHER: ICD-10-CM

## 2022-04-21 DIAGNOSIS — I10 ESSENTIAL HYPERTENSION: Primary | ICD-10-CM

## 2022-04-21 DIAGNOSIS — F41.9 ANXIETY: ICD-10-CM

## 2022-04-21 DIAGNOSIS — Z78.0 POSTMENOPAUSAL STATUS: ICD-10-CM

## 2022-04-21 LAB
ALBUMIN SERPL-MCNC: 3.9 G/DL (ref 3.5–5)
ALP SERPL-CCNC: 64 U/L (ref 45–120)
ALT SERPL W P-5'-P-CCNC: 10 U/L (ref 0–45)
ANION GAP SERPL CALCULATED.3IONS-SCNC: 10 MMOL/L (ref 5–18)
AST SERPL W P-5'-P-CCNC: 15 U/L (ref 0–40)
BILIRUB SERPL-MCNC: 0.7 MG/DL (ref 0–1)
BUN SERPL-MCNC: 18 MG/DL (ref 8–28)
CALCIUM SERPL-MCNC: 9.3 MG/DL (ref 8.5–10.5)
CHLORIDE BLD-SCNC: 105 MMOL/L (ref 98–107)
CHOLEST SERPL-MCNC: 222 MG/DL
CO2 SERPL-SCNC: 27 MMOL/L (ref 22–31)
CREAT SERPL-MCNC: 0.74 MG/DL (ref 0.6–1.1)
FASTING STATUS PATIENT QL REPORTED: YES
GFR SERPL CREATININE-BSD FRML MDRD: 83 ML/MIN/1.73M2
GLUCOSE BLD-MCNC: 102 MG/DL (ref 70–125)
HDLC SERPL-MCNC: 76 MG/DL
LDLC SERPL CALC-MCNC: 129 MG/DL
POTASSIUM BLD-SCNC: 4 MMOL/L (ref 3.5–5)
PROT SERPL-MCNC: 6.5 G/DL (ref 6–8)
SODIUM SERPL-SCNC: 142 MMOL/L (ref 136–145)
TRIGL SERPL-MCNC: 85 MG/DL

## 2022-04-21 PROCEDURE — 36415 COLL VENOUS BLD VENIPUNCTURE: CPT | Performed by: INTERNAL MEDICINE

## 2022-04-21 PROCEDURE — 80061 LIPID PANEL: CPT | Performed by: INTERNAL MEDICINE

## 2022-04-21 PROCEDURE — 80053 COMPREHEN METABOLIC PANEL: CPT | Performed by: INTERNAL MEDICINE

## 2022-04-21 PROCEDURE — 99214 OFFICE O/P EST MOD 30 MIN: CPT | Performed by: INTERNAL MEDICINE

## 2022-04-21 RX ORDER — AMLODIPINE BESYLATE 5 MG/1
TABLET ORAL
Qty: 90 TABLET | Refills: 3 | Status: SHIPPED | OUTPATIENT
Start: 2022-04-21 | End: 2023-06-08

## 2022-04-21 ASSESSMENT — ANXIETY QUESTIONNAIRES
5. BEING SO RESTLESS THAT IT IS HARD TO SIT STILL: MORE THAN HALF THE DAYS
2. NOT BEING ABLE TO STOP OR CONTROL WORRYING: MORE THAN HALF THE DAYS
7. FEELING AFRAID AS IF SOMETHING AWFUL MIGHT HAPPEN: NEARLY EVERY DAY
1. FEELING NERVOUS, ANXIOUS, OR ON EDGE: NEARLY EVERY DAY
4. TROUBLE RELAXING: NEARLY EVERY DAY
GAD7 TOTAL SCORE: 15
7. FEELING AFRAID AS IF SOMETHING AWFUL MIGHT HAPPEN: NEARLY EVERY DAY
6. BECOMING EASILY ANNOYED OR IRRITABLE: SEVERAL DAYS
3. WORRYING TOO MUCH ABOUT DIFFERENT THINGS: SEVERAL DAYS

## 2022-04-21 NOTE — PROGRESS NOTES
[facility] clinic Follow Up Note    Shanna Coto   76 year old female    Date of Visit: 2022    Chief Complaint   Patient presents with     Hypertension     Lab Only     fasting     Subjective  Shanna is a 76-year-old female who is coming in for a hypertension cholesterol follow-up appointment.    She has not checked her blood pressure recently but it has been well controlled.  No orthostasis or edema on amlodipine 5 mg a day.    No generalized myalgias or right upper quadrant pain on pravastatin 20 mg a day.  2020  with HDL 64.    Her father did have a heart attack in his 40s,  of heart disease in 50s.    Patient never smoked.     MRA did not show any carotid artery stenosis.  She has not had cardiac work-up.    She does exercise intermittently, was swimming regularly this winter in [place].  No chest pain or exertional symptoms.    She does work at [employer] 3 times a week with some heavy lifting which she tolerates fine.    She has chronic anxiety, but did not want to speak with a mental health provider.  She did not want to start an SSRI at this time, we have discussed that at previous visit.  She has not been on SSRIs in the past.    She complains of a full body tremor, mainly in  Essential tremor which she has had for many years.  No history of alcohol.  Just moderate caffeine.  She was on Inderal LA 80 mg in the past but had bradycardia issues and fatigue with that.  No longer taking propranolol.    I did review a DEXA scan in  with moderate osteopenia.  She has not been on osteoporosis medication.    She just saw ophthalmology last week and was diagnosed with glaucoma with planned procedure soon.        PMHx:  No past medical history on file.  PSHx:    Past Surgical History:   Procedure Laterality Date     HC REDUCTION OF LARGE BREAST      Description: Breast Surgery Enlargement Procedure Elective;  Recorded: 2009;  Comments: silacone     Immunizations:  "  Immunization History   Administered Date(s) Administered     COVID-19,PF,Pfizer (12+ Yrs) 02/11/2021, 03/04/2021, 11/18/2021     FLUAD(HD)65+ QUAD 11/18/2020, 11/18/2021     Flu 65+ Years 11/13/2019     Flu, Unspecified 10/22/2008, 12/17/2009, 10/28/2010     Influenza (H1N1) 12/17/2009     Influenza (High Dose) 3 valent vaccine 12/18/2015, 10/31/2016, 10/16/2017     Influenza (IIV3) PF 10/22/2008, 10/28/2010, 10/31/2011, 11/28/2012, 11/12/2013, 10/10/2014     Influenza Vaccine, 6+MO IM (QUADRIVALENT W/PRESERVATIVES) 11/12/2013, 10/10/2014     Pneumococcal 23 valent 12/10/2012     Td,adult,historic,unspecified 06/17/2008     Tdap (Adacel,Boostrix) 06/17/2008       ROS A comprehensive review of systems was performed and was otherwise negative    Medications, allergies, and problem list were reviewed and updated    Exam  /80 (BP Location: Right arm, Patient Position: Sitting, Cuff Size: Adult Regular)   Pulse 73   Ht 1.727 m (5' 8\")   Wt 72.1 kg (159 lb)   SpO2 99%   BMI 24.18 kg/m    Moderate essential type tremor of the neck, not severe of the hand.  Alert and oriented x3, just appears mildly anxious.  Gait normal.  Not parkinsonian.  No rigidity.  Lungs are clear.  Heart is regular without murmur rub or gallop.  No carotid bruits.  No JVD.  Abdomen is nontender.  No ankle edema    Assessment/Plan  1. Essential hypertension  Controlled.  Continue current amlodipine  - amLODIPine (NORVASC) 5 MG tablet; [AMLODIPINE (NORVASC) 5 MG TABLET] TAKE 1 TABLET(5 MG) BY MOUTH DAILY  Dispense: 90 tablet; Refill: 3    2. Hypercholesterolemia  Goal LDL at least less than 130, but with family history of heart disease and hypertension, if she does have evidence of coronary calcium/plaque, I would have her treat to a goal of less than 100 LDL.    May consider increase of pravastatin.    No evidence of pravastatin toxicity at this time.  - Lipid Profile  - CT Coronary Calcium Scan; Future    3. Need for hepatitis C " screening test  She declined screening    4. Encounter for therapeutic drug monitoring    - Comprehensive metabolic panel    5. Family history of congenital heart disease in father  As above  - CT Coronary Calcium Scan; Future    6. Postmenopausal status  Repeat DEXA.  Refer for osteoporosis consult if he has progressed to full osteoporosis.  Continue weightbearing exercise regularly on calcium and vitamin D supplement  - DX Hip/Pelvis/Spine; Future    7. Anxiety  Chronic issue.  I recommended referral to mental health services or psychiatry but she declined.  She did not want a referral to neurology for tremor, she had a bad experience with neurology in the past.    I did explain that I am not a psychiatrist but patient does wish to initiate an SSRI treatment with me.  See patient instructions for monitoring for worsening anxiety.  Follow-up in 1 month to reevaluate sertraline initiation.  - sertraline (ZOLOFT) 50 MG tablet; Take 1 tablet (50 mg) by mouth daily  Dispense: 30 tablet; Refill: 3    8. Essential tremor  As above.  I encouraged her to get a neurology referral but she declined.  She did not tolerate Inderal with low heart rate.  Could consider low-dose as needed dose of propranolol in the future.    I discussed immunizations which she is due for, see patient instructions she declined immunizations today.    Newly diagnosed glaucoma, follow-up with ophthalmology.    I did discuss breast cancer screening and she does not wish to do mammograms or breast cancer screening and accepts risk of undiagnosed breast cancer.    I discussed colon cancer screening, she does not wish to do colon cancer screening, and accepts risk of undiagnosed colon cancer.      Return in about 4 weeks (around 5/19/2022) for Follow up.   Patient Instructions   Start sertraline once a day for anxiety.  Sometimes there is mild increased anxiety for 1 to 2 weeks when you first initiate this medication.  If it does significantly worsen  your anxiety or tremor, reduce sertraline down to 25 mg a day and continue medication.  If symptoms become severe, you can discontinue sertraline entirely.    Follow-up in clinic with me in approximately 4 weeks for follow-up on this medication.    If you continue to have significant symptoms, consider referral to psychiatry and/or neurology for further evaluation of the tremor and anxiety.    Continue on current amlodipine for blood pressure.  Goal blood pressure less than 135/85.    Goal LDL cholesterol level less than 130.  He may benefit from a higher dose of pravastatin.  Proceed with screening cardiac CT scan calcium score to screen for coronary artery disease.  If that does show evidence of significant coronary plaque, I would recommend a higher dose of cholesterol medication.    You can get the Shingrix shingles vaccine at local pharmacy.    You are due for a tetanus shot, which can be obtained at local pharmacy.    You are still due for the Prevnar 13 pneumonia vaccine.    I do recommend the COVID-19 vaccine booster for you.    You can schedule a DEXA scan to follow-up on bone density.    José Miguel Sheppard MD, MD        Current Outpatient Medications   Medication Sig Dispense Refill     amLODIPine (NORVASC) 5 MG tablet [AMLODIPINE (NORVASC) 5 MG TABLET] TAKE 1 TABLET(5 MG) BY MOUTH DAILY 90 tablet 3     pravastatin (PRAVACHOL) 20 MG tablet Take 1 tablet (20 mg) by mouth daily 90 tablet 2     sertraline (ZOLOFT) 50 MG tablet Take 1 tablet (50 mg) by mouth daily 30 tablet 3     Vitamin D, Cholecalciferol, 25 MCG (1000 UT) TABS Take 2 tablets by mouth daily       Allergies   Allergen Reactions     Ampicillin Unknown     Penicillins Rash     Social History     Tobacco Use     Smoking status: Never Smoker     Smokeless tobacco: Never Used

## 2022-04-21 NOTE — PATIENT INSTRUCTIONS
Start sertraline once a day for anxiety.  Sometimes there is mild increased anxiety for 1 to 2 weeks when you first initiate this medication.  If it does significantly worsen your anxiety or tremor, reduce sertraline down to 25 mg a day and continue medication.  If symptoms become severe, you can discontinue sertraline entirely.    Follow-up in clinic with me in approximately 4 weeks for follow-up on this medication.    If you continue to have significant symptoms, consider referral to psychiatry and/or neurology for further evaluation of the tremor and anxiety.    Continue on current amlodipine for blood pressure.  Goal blood pressure less than 135/85.    Goal LDL cholesterol level less than 130.  He may benefit from a higher dose of pravastatin.  Proceed with screening cardiac CT scan calcium score to screen for coronary artery disease.  If that does show evidence of significant coronary plaque, I would recommend a higher dose of cholesterol medication.    You can get the Shingrix shingles vaccine at local pharmacy.    You are due for a tetanus shot, which can be obtained at local pharmacy.    You are still due for the Prevnar 13 pneumonia vaccine.    I do recommend the COVID-19 vaccine booster for you.    You can schedule a DEXA scan to follow-up on bone density.

## 2022-04-22 ASSESSMENT — ANXIETY QUESTIONNAIRES: GAD7 TOTAL SCORE: 15

## 2022-05-07 ENCOUNTER — NURSE TRIAGE (OUTPATIENT)
Dept: NURSING | Facility: CLINIC | Age: 77
End: 2022-05-07
Payer: COMMERCIAL

## 2022-05-07 NOTE — TELEPHONE ENCOUNTER
"Nurse Triage SBAR    Is this a 2nd Level Triage? YES, LICENSED PRACTITIONER REVIEW IS REQUIRED    Situation:     Patient calling reporting she started Plaxovid 2 days ago for COVID 19.  Reporting elevated blood pressure 175/93 after decreasing blood pressure medication as advised while on Plaxovid.       Background:   COVID 19 positive 5/3/22.  Stating she was advised to decreased Amlodopine dose to 1/2 dose while on Paxlovid. Patient has decreased dose by half for past 2 days.      Assessment:   Blood pressure reading today 175/93 while checking with wrist cuff. Baseline B/P 140/70's.   Reporting in past 1/2 hour she took \"a sliver\" 1/4 tab of amlodipine.   Reporting COVID 19 symptoms of congestion are improving.  Afebrile.   Ongoing intermittent right sided headache.   Patient is requesting to stop Plaxovid due to taste \"of sucking on pennies.\"     Protocol Recommended Disposition:   Page on call provider.    Recommendation:      Paged to provider     120 pm paged on call provider Dr Davey through [employer] to call Alison at  [phone].     Dr Davey returned page advised ok for patient to stop Plaxovid due to side effects.    Advised to have patient return to Amlodipine dose as prescribed tonight.       Does the patient meet one of the following criteria for ADS visit consideration? 16+ years old, with an [facility] PCP     Provider Recommendation Follow Up:   Reached patient/caregiver. Informed of provider's recommendations. Patient verbalized understanding and agrees with the plan.         Reason for Disposition    Systolic BP  >= 160 OR Diastolic >= 100    Additional Information    Negative: Difficult to awaken or acting confused (e.g., disoriented, slurred speech)    Negative: Severe difficulty breathing (e.g., struggling for each breath, speaks in single words)    Negative: [1] Weakness of the face, arm or leg on one side of the body AND [2] new onset    Negative: [1] Numbness (i.e., loss of " sensation) of the face, arm or leg on one side of the body AND [2] new onset    Negative: [1] Chest pain lasts > 5 minutes AND [2] history of heart disease  (i.e., heart attack, bypass surgery, angina, angioplasty, CHF)    Negative: [1] Chest pain AND [2] took nitrogylcerin AND [3] pain was not relieved    Negative: Sounds like a life-threatening emergency to the triager    Negative: Symptom is main concern  (e.g., headache, chest pain)    Negative: Low blood pressure is main concern    Negative: [1] Systolic BP  >= 160 OR Diastolic >= 100 AND [2] cardiac or neurologic symptoms (e.g., chest pain, difficulty breathing, unsteady gait, blurred vision)    Negative: [1] Pregnant > 20 weeks (or postpartum < 6 weeks) AND [2] new hand or face swelling    Negative: [1] Pregnant > 20 weeks AND [2] BP Systolic BP  >= 140 OR Diastolic >= 90    Negative: [1] Systolic BP  >= 200 OR Diastolic >= 120  AND [2] having NO cardiac or neurologic symptoms    Negative: [1] Postpartum < 6 weeks AND [2] BP Systolic BP  >= 140 OR Diastolic >= 90    Negative: [1] Systolic BP  >= 180 OR Diastolic >= 110 AND [2] missed most recent dose of blood pressure medication    Negative: Systolic BP  >= 180 OR Diastolic >= 110    Negative: Ran out of BP medications    Protocols used: HIGH BLOOD PRESSURE-A-AH    COVID 19 Nurse Triage Plan/Patient Instructions    Please be aware that novel coronavirus (COVID-19) may be circulating in the community. If you develop symptoms such as fever, cough, or SOB or if you have concerns about the presence of another infection including coronavirus (COVID-19), please contact your health care provider or visit [email or web address].     Disposition/Instructions    Home care recommended. Follow home care protocol based instructions.    Thank you for taking steps to prevent the spread of this virus.  o Limit your contact with others.  o Wear a simple mask to cover your cough.  o Wash your hands well and  often.    Resources    [facility]: About COVID-19: [email or web address]    CDC: What to Do If You're Sick: www.cdc.gov/coronavirus/2019-ncov/about/steps-when-sick.html    CDC: Ending Home Isolation: www.cdc.gov/coronavirus/2019-ncov/hcp/disposition-in-home-patients.html     CDC: Caring for Someone: www.cdc.gov/coronavirus/2019-ncov/if-you-are-sick/care-for-someone.html     [facility]: Interim Guidance for Hospital Discharge to Home: www.health.[email or web address]./diseases/coronavirus/hcp/hospdischarge.pdf    [facility] clinical trials (COVID-19 research studies): [email or web address]     Below are the COVID-19 hotlines at the [place] [facility] of [facility] ([facility]). Interpreters are available.   o For health questions: Call 337-571-9380 or 1-238.595.3785 (7 a.m. to 7 p.m.)  o For questions about schools and childcare: Call 611-243-6471 or 1-340.995.7859 (7 a.m. to 7 p.m.)

## 2022-05-22 ENCOUNTER — HEALTH MAINTENANCE LETTER (OUTPATIENT)
Age: 77
End: 2022-05-22

## 2022-06-21 ENCOUNTER — ANCILLARY PROCEDURE (OUTPATIENT)
Dept: BONE DENSITY | Facility: CLINIC | Age: 77
End: 2022-06-21
Attending: INTERNAL MEDICINE
Payer: COMMERCIAL

## 2022-06-21 ENCOUNTER — HOSPITAL ENCOUNTER (OUTPATIENT)
Dept: CT IMAGING | Facility: CLINIC | Age: 77
Discharge: HOME | End: 2022-06-21
Attending: INTERNAL MEDICINE | Admitting: INTERNAL MEDICINE
Payer: COMMERCIAL

## 2022-06-21 DIAGNOSIS — Z78.0 POSTMENOPAUSAL STATUS: ICD-10-CM

## 2022-06-21 DIAGNOSIS — Z78.0 POST-MENOPAUSAL: ICD-10-CM

## 2022-06-21 DIAGNOSIS — E78.00 HYPERCHOLESTEROLEMIA: ICD-10-CM

## 2022-06-21 DIAGNOSIS — Z82.79 FAMILY HISTORY OF CONGENITAL HEART DISEASE IN FATHER: ICD-10-CM

## 2022-06-21 LAB
CV CALCIUM SCORE AGATSTON LM: 0
CV CALCIUM SCORING AGATSON LAD: 1
CV CALCIUM SCORING AGATSTON CX: 0
CV CALCIUM SCORING AGATSTON RCA: 0
CV CALCIUM SCORING AGATSTON TOTAL: 1

## 2022-06-21 PROCEDURE — 75571 CT HRT W/O DYE W/CA TEST: CPT

## 2022-06-21 PROCEDURE — 75571 CT HRT W/O DYE W/CA TEST: CPT | Mod: 26 | Performed by: INTERNAL MEDICINE

## 2022-06-21 PROCEDURE — 77081 DXA BONE DENSITY APPENDICULR: CPT | Mod: TC | Performed by: RADIOLOGY

## 2022-06-21 PROCEDURE — 77080 DXA BONE DENSITY AXIAL: CPT | Mod: TC | Performed by: RADIOLOGY

## 2022-09-25 ENCOUNTER — HEALTH MAINTENANCE LETTER (OUTPATIENT)
Age: 77
End: 2022-09-25

## 2022-10-20 DIAGNOSIS — E78.00 PURE HYPERCHOLESTEROLEMIA: ICD-10-CM

## 2022-10-20 RX ORDER — PRAVASTATIN SODIUM 20 MG
20 TABLET ORAL DAILY
Qty: 90 TABLET | Refills: 3 | Status: SHIPPED | OUTPATIENT
Start: 2022-10-20 | End: 2023-10-17

## 2022-12-11 ENCOUNTER — TRANSFERRED RECORDS (OUTPATIENT)
Dept: HEALTH INFORMATION MANAGEMENT | Facility: CLINIC | Age: 77
End: 2022-12-11

## 2023-03-16 ENCOUNTER — TRANSFERRED RECORDS (OUTPATIENT)
Dept: HEALTH INFORMATION MANAGEMENT | Facility: CLINIC | Age: 78
End: 2023-03-16

## 2023-06-04 ENCOUNTER — HEALTH MAINTENANCE LETTER (OUTPATIENT)
Age: 78
End: 2023-06-04

## 2023-06-08 DIAGNOSIS — I10 ESSENTIAL HYPERTENSION: ICD-10-CM

## 2023-06-08 RX ORDER — AMLODIPINE BESYLATE 5 MG/1
TABLET ORAL
Qty: 90 TABLET | Refills: 3 | Status: SHIPPED | OUTPATIENT
Start: 2023-06-08 | End: 2024-03-25

## 2023-10-17 DIAGNOSIS — E78.00 PURE HYPERCHOLESTEROLEMIA: ICD-10-CM

## 2023-10-18 RX ORDER — PRAVASTATIN SODIUM 20 MG
20 TABLET ORAL DAILY
Qty: 90 TABLET | Refills: 3 | Status: SHIPPED | OUTPATIENT
Start: 2023-10-18 | End: 2024-03-25

## 2023-12-01 ENCOUNTER — TRANSFERRED RECORDS (OUTPATIENT)
Dept: HEALTH INFORMATION MANAGEMENT | Facility: CLINIC | Age: 78
End: 2023-12-01
Payer: COMMERCIAL

## 2023-12-01 ENCOUNTER — PATIENT OUTREACH (OUTPATIENT)
Dept: INTERNAL MEDICINE | Facility: CLINIC | Age: 78
End: 2023-12-01
Payer: COMMERCIAL

## 2023-12-01 NOTE — TELEPHONE ENCOUNTER
Patient Quality Outreach    Patient is due for the following:   Hypertension -  BP check  Depression  -  PHQ-9 needed  Physical Annual Wellness Visit      Topic Date Due    Zoster (Shingles) Vaccine (1 of 2) Never done    Pneumococcal Vaccine (2 - PCV) 12/10/2013    Diptheria Tetanus Pertussis (DTAP/TDAP/TD) Vaccine (2 - Td or Tdap) 06/17/2018    Flu Vaccine (1) 09/01/2023    COVID-19 Vaccine (4 - 2023-24 season) 09/01/2023       Next Steps:   Schedule a Annual Wellness Visit    Type of outreach:    Phone, left message for patient/parent to call back. and Sent [employer] message.      Questions for provider review:    None           Melinda Valencia MA

## 2024-03-25 ENCOUNTER — OFFICE VISIT (OUTPATIENT)
Dept: INTERNAL MEDICINE | Facility: CLINIC | Age: 79
End: 2024-03-25
Payer: COMMERCIAL

## 2024-03-25 VITALS
RESPIRATION RATE: 16 BRPM | OXYGEN SATURATION: 96 % | SYSTOLIC BLOOD PRESSURE: 128 MMHG | HEART RATE: 68 BPM | WEIGHT: 160 LBS | BODY MASS INDEX: 24.25 KG/M2 | TEMPERATURE: 97.7 F | HEIGHT: 68 IN | DIASTOLIC BLOOD PRESSURE: 84 MMHG

## 2024-03-25 DIAGNOSIS — Z23 NEED FOR VACCINATION AGAINST STREPTOCOCCUS PNEUMONIAE: ICD-10-CM

## 2024-03-25 DIAGNOSIS — Z51.81 ENCOUNTER FOR THERAPEUTIC DRUG MONITORING: ICD-10-CM

## 2024-03-25 DIAGNOSIS — I10 ESSENTIAL HYPERTENSION: Primary | ICD-10-CM

## 2024-03-25 DIAGNOSIS — E78.00 PURE HYPERCHOLESTEROLEMIA: ICD-10-CM

## 2024-03-25 LAB
ALBUMIN SERPL BCG-MCNC: 4.2 G/DL (ref 3.5–5.2)
ALP SERPL-CCNC: 64 U/L (ref 40–150)
ALT SERPL W P-5'-P-CCNC: 10 U/L (ref 0–50)
ANION GAP SERPL CALCULATED.3IONS-SCNC: 9 MMOL/L (ref 7–15)
AST SERPL W P-5'-P-CCNC: 20 U/L (ref 0–45)
BILIRUB SERPL-MCNC: 0.4 MG/DL
BUN SERPL-MCNC: 15.9 MG/DL (ref 8–23)
CALCIUM SERPL-MCNC: 9.3 MG/DL (ref 8.8–10.2)
CHLORIDE SERPL-SCNC: 105 MMOL/L (ref 98–107)
CHOLEST SERPL-MCNC: 223 MG/DL
CREAT SERPL-MCNC: 0.89 MG/DL (ref 0.51–0.95)
DEPRECATED HCO3 PLAS-SCNC: 27 MMOL/L (ref 22–29)
EGFRCR SERPLBLD CKD-EPI 2021: 66 ML/MIN/1.73M2
ERYTHROCYTE [DISTWIDTH] IN BLOOD BY AUTOMATED COUNT: 12.9 % (ref 10–15)
FASTING STATUS PATIENT QL REPORTED: YES
GLUCOSE SERPL-MCNC: 100 MG/DL (ref 70–99)
HCT VFR BLD AUTO: 43.4 % (ref 35–47)
HDLC SERPL-MCNC: 75 MG/DL
HGB BLD-MCNC: 14.6 G/DL (ref 11.7–15.7)
LDLC SERPL CALC-MCNC: 127 MG/DL
MCH RBC QN AUTO: 31.5 PG (ref 26.5–33)
MCHC RBC AUTO-ENTMCNC: 33.6 G/DL (ref 31.5–36.5)
MCV RBC AUTO: 94 FL (ref 78–100)
NONHDLC SERPL-MCNC: 148 MG/DL
PLATELET # BLD AUTO: 227 10E3/UL (ref 150–450)
POTASSIUM SERPL-SCNC: 4.2 MMOL/L (ref 3.4–5.3)
PROT SERPL-MCNC: 6.8 G/DL (ref 6.4–8.3)
RBC # BLD AUTO: 4.64 10E6/UL (ref 3.8–5.2)
SODIUM SERPL-SCNC: 141 MMOL/L (ref 135–145)
TRIGL SERPL-MCNC: 103 MG/DL
WBC # BLD AUTO: 4.9 10E3/UL (ref 4–11)

## 2024-03-25 PROCEDURE — 80061 LIPID PANEL: CPT | Performed by: INTERNAL MEDICINE

## 2024-03-25 PROCEDURE — 85027 COMPLETE CBC AUTOMATED: CPT | Performed by: INTERNAL MEDICINE

## 2024-03-25 PROCEDURE — 80053 COMPREHEN METABOLIC PANEL: CPT | Performed by: INTERNAL MEDICINE

## 2024-03-25 PROCEDURE — 99207 PR NO CHARGE LOS: CPT | Performed by: INTERNAL MEDICINE

## 2024-03-25 PROCEDURE — 99214 OFFICE O/P EST MOD 30 MIN: CPT | Performed by: INTERNAL MEDICINE

## 2024-03-25 PROCEDURE — 36415 COLL VENOUS BLD VENIPUNCTURE: CPT | Performed by: INTERNAL MEDICINE

## 2024-03-25 RX ORDER — PRAVASTATIN SODIUM 20 MG
20 TABLET ORAL DAILY
Qty: 90 TABLET | Refills: 3 | Status: SHIPPED | OUTPATIENT
Start: 2024-03-25

## 2024-03-25 RX ORDER — AMLODIPINE BESYLATE 5 MG/1
TABLET ORAL
Qty: 90 TABLET | Refills: 3 | Status: SHIPPED | OUTPATIENT
Start: 2024-03-25

## 2024-03-25 NOTE — PROGRESS NOTES
Shanna Coto   78 year old female    Date of Visit: 3/25/2024    Chief Complaint   Patient presents with    Follow Up     Medications- blood pressure and chol- pt is fasting    Pain     Down left leg- 1 year- getting worse     Subjective  Shanna is a 78-year-old female living independently, was down in [place] earlier this winter.  Was doing quite a bit of walking and did have some lateral thigh fasciitis type symptoms, somewhat better now.  Worse with longer walking.  No leg weakness or symptoms beyond the knee.  No trauma.    She did have some left neck shingles in December of last year, relatively mild and was treated with Valtrex.  No eye involvement.  She was taking some Advil at that time but not taking Advil or NSAIDs now.    She is on amlodipine 5 mg a day.    Her blood pressure cuff, wrist cuff, is giving her numbers 141/76-1 60/87.  Blood pressure was normal today.    No edema.    Weight is stable.  Diet is okay.    She has a family history of coronary disease with father.  She is never smoked.  Cholesterol levels were adequately controlled previously on pravastatin 20 mg.  No generalized myalgias or history of liver problems.  No abdominal pain complaints.    June 2022 cardiac CT scan calcium score 1.  2010 MRI was negative for carotid artery stenosis.    No chest pain or chest pressure or palpitations.    Still works at [employer] 3 days a week, enjoys that.    History of anxiety but denies flare.  Did not take sertraline and never started it.    Full body tremor is stable.  She had fatigue on propranolol.  She does not wish to treat her tremor.  No falls.    June 2022 DEXA scan of the femur score -1.2/-2.1 with a radius score of -1.7.    She did have COVID earlier this year.  She has a mild cough now but denies fever or worsening symptoms.    She was diagnosed with glaucoma previously but had a laser procedure.  Denies vision changes or headaches.  But she is due for an eye exam soon.    PMHx:  No  "past medical history on file.  PSHx:    Past Surgical History:   Procedure Laterality Date    HC REDUCTION OF LARGE BREAST      Description: Breast Surgery Enlargement Procedure Elective;  Recorded: 12/18/2009;  Comments: naveed     Immunizations:   Immunization History   Administered Date(s) Administered    COVID-19 Bivalent 18+ (Moderna) 11/02/2022    COVID-19 MONOVALENT 12+ (Pfizer) 02/11/2021, 03/04/2021, 11/18/2021    Flu 65+ Years 11/13/2019    Flu, Unspecified 10/22/2008, 12/17/2009, 10/28/2010    Influenza (H1N1) 12/17/2009    Influenza (High Dose) 3 valent vaccine 12/18/2015, 10/31/2016, 10/16/2017    Influenza (IIV3) PF 10/22/2008, 10/28/2010, 10/31/2011, 11/28/2012, 11/12/2013, 10/10/2014    Influenza Vaccine 65+ (FLUAD) 11/18/2020, 11/18/2021    Influenza Vaccine 65+ (Fluzone HD) 11/02/2022    Influenza Vaccine, 6+MO IM (QUADRIVALENT W/PRESERVATIVES) 11/12/2013, 10/10/2014    Pneumococcal 23 valent 12/10/2012    TDAP (Adacel,Boostrix) 06/17/2008    Td,adult,historic,unspecified 06/17/2008       ROS A comprehensive review of systems was performed and was otherwise negative    Medications, allergies, and problem list were reviewed and updated    Exam  /84 (BP Location: Right arm, Patient Position: Sitting)   Pulse 68   Temp 97.7  F (36.5  C)   Resp 16   Ht 1.727 m (5' 8\")   Wt 72.6 kg (160 lb)   LMP  (LMP Unknown)   SpO2 96%   BMI 24.33 kg/m    Appears well.  Mobility is normal.  Mild essential tremor.  Cognition normal.  Mood and affect good.  Lungs are clear.  Heart is regular without murmur.  No JVD.  No lower extremity edema.    Assessment/Plan  1. Essential hypertension  Controlled today in clinic but higher numbers at home.  Unclear if her cuff is accurate.  I asked her to get a Omron arm blood pressure cuff, see patient instructions.  Follow-up in 1 to 2 months if blood pressure elevated.  Otherwise follow-up with the [facility] adult wellness visit and blood pressure follow-up.  - " amLODIPine (NORVASC) 5 MG tablet; [AMLODIPINE (NORVASC) 5 MG TABLET] TAKE 1 TABLET(5 MG) BY MOUTH DAILY  Dispense: 90 tablet; Refill: 3    2. Pure hypercholesterolemia  Continue current medication.  Goal LDL less than 130.  Family history of coronary disease.  - Lipid panel reflex to direct LDL Non-fasting  - pravastatin (PRAVACHOL) 20 MG tablet; Take 1 tablet (20 mg) by mouth daily  Dispense: 90 tablet; Refill: 3    3. Anxiety  Not an issue.  Not on sertraline    4. Encounter for therapeutic drug monitoring    - CBC with platelets  - Comprehensive metabolic panel    5. Need for vaccination against Streptococcus pneumoniae  She declined vaccine at this time but I recommended vaccine  - Pneumococcal 20 Valent Conjugate (PCV20); Future    Full-body essential tremor, stable.  Not desiring treatment.  Did not tolerate propranolol.    Lateral thigh pain suggestive of fasciitis, possible exacerbation with assess of walking in [place].  Better now.  I recommended some gentle lateral thigh and hip stretching.  If symptoms or not improving, I would recommend physical therapy and/or orthopedic clinic referral.    History of glaucoma, she was reminded to make an ophthalmology appointment this spring    Enjoys spending time with her great grandchild    The longitudinal plan of care for the diagnosis(es)/condition(s) as documented were addressed during this visit. Due to the added complexity in care, I will continue to support Shanna in the subsequent management and with ongoing continuity of care.        Return in about 6 months (around 9/25/2024) for Adult wellness visit physical exam and blood pressure follow-up.   Patient Instructions   Goal blood pressure was 135/85, but not less than 110/60.    Get a new arm Omron blood pressure cuff to check your blood pressure.  If your new blood pressure cuff is still giving you high numbers, bring the blood pressure cuff to clinic to discuss additional or alternative blood pressure  medication.    Continue amlodipine 5 mg a day at this time.    Avoid ibuprofen or Advil or Aleve, which can affect blood pressure.    Maintain a low-salt diet.    Continue regular walking, 20 minutes of walking or weightbearing exercise is recommended daily.    But avoid over straining activity that may cause pain.  If you are having worsening left leg pain, consider physical therapy or an orthopedic clinic appointment.    You are due for the Prevnar 20 pneumococcal pneumonia vaccine.  That can be obtained at local pharmacy, or you can return to clinic at a future appointment.    You will be due for a DEXA bone density scan after June of this year.    Consider an adult wellness visit physical exam and blood pressure checkup this fall.    See ophthalmology for eye pressure evaluation and yearly eye exam.    Look on your [employer] computer portal for results of today's lab work.    José Miguel Sheppard MD, MD        Current Outpatient Medications   Medication Sig Dispense Refill    amLODIPine (NORVASC) 5 MG tablet [AMLODIPINE (NORVASC) 5 MG TABLET] TAKE 1 TABLET(5 MG) BY MOUTH DAILY 90 tablet 3    pravastatin (PRAVACHOL) 20 MG tablet Take 1 tablet (20 mg) by mouth daily 90 tablet 3    Vitamin D, Cholecalciferol, 25 MCG (1000 UT) TABS Take 2 tablets by mouth daily       Allergies   Allergen Reactions    Ampicillin Unknown    Penicillins Rash     Social History     Tobacco Use    Smoking status: Never     Passive exposure: Never    Smokeless tobacco: Never   Vaping Use    Vaping Use: Never used             Subjective   Shanna is a 78 year old, presenting for the following health issues:  Follow Up (Medications- blood pressure and chol- pt is fasting)      3/25/2024     9:43 AM   Additional Questions   Roomed by Rachelle NAZARIO     History of Present Illness       Back Pain:  She presents for follow up of back pain. Patient's back pain is a recurring problem.  Location of back pain:  Left upper back and left hip  Description of back  "pain: burning and dull ache  Back pain spreads: left thigh    Since patient first noticed back pain, pain is: always present, but gets better and worse  Does back pain interfere with her job:  No       Hyperlipidemia:  She presents for follow up of hyperlipidemia.   She is taking medication to lower cholesterol. She is not having myalgia or other side effects to statin medications.    Hypertension: She presents for follow up of hypertension.  She does check blood pressure  regularly outside of the clinic. Outside blood pressures have been over 140/90. She does not follow a low salt diet.     She eats 2-3 servings of fruits and vegetables daily.She consumes 1 sweetened beverage(s) daily.She exercises with enough effort to increase her heart rate 10 to 19 minutes per day.  She exercises with enough effort to increase her heart rate 3 or less days per week.   She is taking medications regularly.                     Objective    /84 (BP Location: Right arm, Patient Position: Sitting)   Pulse 68   Temp 97.7  F (36.5  C)   Resp 16   Ht 1.727 m (5' 8\")   Wt 72.6 kg (160 lb)   LMP  (LMP Unknown)   SpO2 96%   BMI 24.33 kg/m    Body mass index is 24.33 kg/m .  Physical Exam               Signed Electronically by: José Miguel Sheppard MD    "

## 2024-03-25 NOTE — PATIENT INSTRUCTIONS
Goal blood pressure was 135/85, but not less than 110/60.    Get a new arm [employer] blood pressure cuff to check your blood pressure.  If your new blood pressure cuff is still giving you high numbers, bring the blood pressure cuff to clinic to discuss additional or alternative blood pressure medication.    Continue amlodipine 5 mg a day at this time.    Avoid ibuprofen or Advil or Aleve, which can affect blood pressure.    Maintain a low-salt diet.    Continue regular walking, 20 minutes of walking or weightbearing exercise is recommended daily.    But avoid over straining activity that may cause pain.  If you are having worsening left leg pain, consider physical therapy or an orthopedic clinic appointment.    You are due for the Prevnar 20 pneumococcal pneumonia vaccine.  That can be obtained at local pharmacy, or you can return to clinic at a future appointment.    You will be due for a DEXA bone density scan after June of this year.    Consider an adult wellness visit physical exam and blood pressure checkup this fall.    See ophthalmology for eye pressure evaluation and yearly eye exam.    Look on your [facility] computer portal for results of today's lab work.

## 2024-03-28 ENCOUNTER — TRANSFERRED RECORDS (OUTPATIENT)
Dept: HEALTH INFORMATION MANAGEMENT | Facility: CLINIC | Age: 79
End: 2024-03-28
Payer: COMMERCIAL

## 2024-04-05 ENCOUNTER — TRANSFERRED RECORDS (OUTPATIENT)
Dept: HEALTH INFORMATION MANAGEMENT | Facility: CLINIC | Age: 79
End: 2024-04-05
Payer: COMMERCIAL

## 2024-07-20 ENCOUNTER — HEALTH MAINTENANCE LETTER (OUTPATIENT)
Age: 79
End: 2024-07-20

## 2024-09-25 ENCOUNTER — OFFICE VISIT (OUTPATIENT)
Dept: INTERNAL MEDICINE | Facility: CLINIC | Age: 79
End: 2024-09-25
Payer: COMMERCIAL

## 2024-09-25 VITALS
OXYGEN SATURATION: 97 % | HEART RATE: 63 BPM | WEIGHT: 161 LBS | BODY MASS INDEX: 24.4 KG/M2 | RESPIRATION RATE: 18 BRPM | DIASTOLIC BLOOD PRESSURE: 78 MMHG | SYSTOLIC BLOOD PRESSURE: 146 MMHG | TEMPERATURE: 98 F | HEIGHT: 68 IN

## 2024-09-25 DIAGNOSIS — I10 ESSENTIAL HYPERTENSION: ICD-10-CM

## 2024-09-25 DIAGNOSIS — E78.00 HYPERCHOLESTEROLEMIA: ICD-10-CM

## 2024-09-25 DIAGNOSIS — Z00.00 ENCOUNTER FOR WELLNESS EXAMINATION IN ADULT: Primary | ICD-10-CM

## 2024-09-25 DIAGNOSIS — Z51.81 ENCOUNTER FOR THERAPEUTIC DRUG MONITORING: ICD-10-CM

## 2024-09-25 DIAGNOSIS — G25.0 ESSENTIAL TREMOR: ICD-10-CM

## 2024-09-25 DIAGNOSIS — Z23 NEED FOR VACCINATION AGAINST STREPTOCOCCUS PNEUMONIAE: ICD-10-CM

## 2024-09-25 LAB
ALBUMIN SERPL BCG-MCNC: 4.4 G/DL (ref 3.5–5.2)
ALP SERPL-CCNC: 65 U/L (ref 40–150)
ALT SERPL W P-5'-P-CCNC: 12 U/L (ref 0–50)
ANION GAP SERPL CALCULATED.3IONS-SCNC: 9 MMOL/L (ref 7–15)
AST SERPL W P-5'-P-CCNC: 22 U/L (ref 0–45)
BILIRUB SERPL-MCNC: 0.8 MG/DL
BUN SERPL-MCNC: 18 MG/DL (ref 8–23)
CALCIUM SERPL-MCNC: 9.2 MG/DL (ref 8.8–10.4)
CHLORIDE SERPL-SCNC: 107 MMOL/L (ref 98–107)
CHOLEST SERPL-MCNC: 226 MG/DL
CREAT SERPL-MCNC: 0.79 MG/DL (ref 0.51–0.95)
EGFRCR SERPLBLD CKD-EPI 2021: 76 ML/MIN/1.73M2
ERYTHROCYTE [DISTWIDTH] IN BLOOD BY AUTOMATED COUNT: 12.9 % (ref 10–15)
FASTING STATUS PATIENT QL REPORTED: YES
FASTING STATUS PATIENT QL REPORTED: YES
GLUCOSE SERPL-MCNC: 96 MG/DL (ref 70–99)
HCO3 SERPL-SCNC: 26 MMOL/L (ref 22–29)
HCT VFR BLD AUTO: 44.7 % (ref 35–47)
HDLC SERPL-MCNC: 88 MG/DL
HGB BLD-MCNC: 15.2 G/DL (ref 11.7–15.7)
LDLC SERPL CALC-MCNC: 126 MG/DL
MCH RBC QN AUTO: 31.7 PG (ref 26.5–33)
MCHC RBC AUTO-ENTMCNC: 34 G/DL (ref 31.5–36.5)
MCV RBC AUTO: 93 FL (ref 78–100)
NONHDLC SERPL-MCNC: 138 MG/DL
PLATELET # BLD AUTO: 245 10E3/UL (ref 150–450)
POTASSIUM SERPL-SCNC: 4.2 MMOL/L (ref 3.4–5.3)
PROT SERPL-MCNC: 7.1 G/DL (ref 6.4–8.3)
RBC # BLD AUTO: 4.8 10E6/UL (ref 3.8–5.2)
SODIUM SERPL-SCNC: 142 MMOL/L (ref 135–145)
TRIGL SERPL-MCNC: 61 MG/DL
WBC # BLD AUTO: 5.4 10E3/UL (ref 4–11)

## 2024-09-25 PROCEDURE — 80053 COMPREHEN METABOLIC PANEL: CPT | Performed by: INTERNAL MEDICINE

## 2024-09-25 PROCEDURE — G0439 PPPS, SUBSEQ VISIT: HCPCS | Performed by: INTERNAL MEDICINE

## 2024-09-25 PROCEDURE — 85027 COMPLETE CBC AUTOMATED: CPT | Performed by: INTERNAL MEDICINE

## 2024-09-25 PROCEDURE — 80061 LIPID PANEL: CPT | Performed by: INTERNAL MEDICINE

## 2024-09-25 PROCEDURE — 99214 OFFICE O/P EST MOD 30 MIN: CPT | Mod: 25 | Performed by: INTERNAL MEDICINE

## 2024-09-25 PROCEDURE — 90677 PCV20 VACCINE IM: CPT | Performed by: INTERNAL MEDICINE

## 2024-09-25 PROCEDURE — G0009 ADMIN PNEUMOCOCCAL VACCINE: HCPCS | Performed by: INTERNAL MEDICINE

## 2024-09-25 PROCEDURE — 36415 COLL VENOUS BLD VENIPUNCTURE: CPT | Performed by: INTERNAL MEDICINE

## 2024-09-25 RX ORDER — LISINOPRIL 2.5 MG/1
2.5 TABLET ORAL DAILY
Qty: 90 TABLET | Refills: 1 | Status: SHIPPED | OUTPATIENT
Start: 2024-09-25

## 2024-09-25 NOTE — PATIENT INSTRUCTIONS
Add lisinopril 2.5 mg a day for your blood pressure.  Continue amlodipine 5 mg a day.  Goal blood pressure less than 135/85 but not less than 110/60.  If you have a blood pressure cuff at home, bring it with you to check against ours at next visit.  See me in 3 weeks to recheck blood pressure, and I will recheck your kidney labs.  Do not come fasting.    Maintain regular exercise, try to at least 20 minutes 3 times a week or more of walking.  Walking regular exercise helps blood pressure.    Avoid ibuprofen or Advil or Aleve type medicines, as that can affect blood pressure and kidney function.    Maintain a low-salt diet.    Do gentle back stretching on a regular basis for your back tightness.  You can request a physical therapy referral in the future.    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  If that worsens, we could consider a neurology consult to discuss treatment options other than propranolol.    Calcium citrate 500 mg a day supplement is recommended if you are not eating calcium rich foods daily.  Vitamin D 1000 IU a day is recommended for the winter months.    Continue to see ophthalmology regularly for glaucoma monitoring.    Get a COVID and flu vaccine at local pharmacy this fall, due now.    You are overdue for a tetanus shot, which can be obtained at local pharmacy.    Consider a DEXA bone density screening test next year.    Continue yearly skin exams in April.
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
## 2024-09-27 ENCOUNTER — TRANSFERRED RECORDS (OUTPATIENT)
Dept: HEALTH INFORMATION MANAGEMENT | Facility: CLINIC | Age: 79
End: 2024-09-27
Payer: COMMERCIAL

## 2024-10-22 ENCOUNTER — OFFICE VISIT (OUTPATIENT)
Dept: INTERNAL MEDICINE | Facility: CLINIC | Age: 79
End: 2024-10-22
Payer: COMMERCIAL

## 2024-10-22 VITALS
BODY MASS INDEX: 24.55 KG/M2 | SYSTOLIC BLOOD PRESSURE: 126 MMHG | OXYGEN SATURATION: 97 % | WEIGHT: 162 LBS | HEIGHT: 68 IN | TEMPERATURE: 98.1 F | RESPIRATION RATE: 18 BRPM | HEART RATE: 61 BPM | DIASTOLIC BLOOD PRESSURE: 72 MMHG

## 2024-10-22 DIAGNOSIS — Z51.81 ENCOUNTER FOR THERAPEUTIC DRUG MONITORING: ICD-10-CM

## 2024-10-22 DIAGNOSIS — I10 ESSENTIAL HYPERTENSION: Primary | ICD-10-CM

## 2024-10-22 LAB
ANION GAP SERPL CALCULATED.3IONS-SCNC: 10 MMOL/L (ref 7–15)
BUN SERPL-MCNC: 19.2 MG/DL (ref 8–23)
CALCIUM SERPL-MCNC: 9.2 MG/DL (ref 8.8–10.4)
CHLORIDE SERPL-SCNC: 107 MMOL/L (ref 98–107)
CREAT SERPL-MCNC: 0.85 MG/DL (ref 0.51–0.95)
EGFRCR SERPLBLD CKD-EPI 2021: 70 ML/MIN/1.73M2
GLUCOSE SERPL-MCNC: 97 MG/DL (ref 70–99)
HCO3 SERPL-SCNC: 26 MMOL/L (ref 22–29)
POTASSIUM SERPL-SCNC: 4.1 MMOL/L (ref 3.4–5.3)
SODIUM SERPL-SCNC: 143 MMOL/L (ref 135–145)

## 2024-10-22 PROCEDURE — 36415 COLL VENOUS BLD VENIPUNCTURE: CPT | Performed by: INTERNAL MEDICINE

## 2024-10-22 PROCEDURE — 80048 BASIC METABOLIC PNL TOTAL CA: CPT | Performed by: INTERNAL MEDICINE

## 2024-10-22 PROCEDURE — G2211 COMPLEX E/M VISIT ADD ON: HCPCS | Performed by: INTERNAL MEDICINE

## 2024-10-22 PROCEDURE — 99213 OFFICE O/P EST LOW 20 MIN: CPT | Performed by: INTERNAL MEDICINE

## 2024-10-22 NOTE — PATIENT INSTRUCTIONS
Continue on current medications.    Goal blood pressure less than 135/85 but not less than 110/60.  Contact me for blood pressures outside the regimen occasionally.  Otherwise, see me in the spring for blood pressure check appointment.

## 2024-10-22 NOTE — PROGRESS NOTES
Shanna Coto   78 year old female    Date of Visit: 10/22/2024    Chief Complaint   Patient presents with    Follow Up     BP check. Started Lisinopril, feeling a little tired     Subjective  Blood pressure was running mildly high, and was 146/78 last month.  She was on amlodipine 5 mg a day.  I added lisinopril 2.5 mg a day.  No cough.  No lightheaded dizzy spells.  Some mild increased fatigue but tolerable.    She has a history of fatigue with propranolol as well in the past that she had used for essential familial tremor, does not use that now.    No palpitations.    She did get a sore throat and a cough just after her physical exam with me on September 25 that she has fully recovered now, just last week, consistent with a mild cold    PMHx:  No past medical history on file.  PSHx:    Past Surgical History:   Procedure Laterality Date    HC REDUCTION OF LARGE BREAST      Description: Breast Surgery Enlargement Procedure Elective;  Recorded: 12/18/2009;  Comments: naveed     Immunizations:   Immunization History   Administered Date(s) Administered    COVID-19 Bivalent 18+ (Moderna) 11/02/2022    COVID-19 MONOVALENT 12+ (Pfizer) 02/11/2021, 03/04/2021, 11/18/2021    Flu 65+ (Fluad) 11/13/2019    Flu, Unspecified 10/22/2008, 12/17/2009, 10/28/2010    Influenza (H1N1) 12/17/2009    Influenza (High Dose) Trivalent,PF (Fluzone) 12/18/2015, 10/31/2016, 10/16/2017    Influenza (IIV3) PF 10/22/2008, 10/28/2010, 10/31/2011, 11/28/2012, 11/12/2013, 10/10/2014    Influenza Vaccine 65+ (FLUAD) 11/18/2020, 11/18/2021    Influenza Vaccine 65+ (Fluzone HD) 11/02/2022    Influenza Vaccine, 6+MO IM (QUADRIVALENT W/PRESERVATIVES) 11/12/2013, 10/10/2014    Pneumococcal 20 valent Conjugate (Prevnar 20) 09/25/2024    Pneumococcal 23 valent 12/10/2012    TDAP (Adacel,Boostrix) 06/17/2008    Td,adult,historic,unspecified 06/17/2008       ROS A comprehensive review of systems was performed and was otherwise  "negative    Medications, allergies, and problem list were reviewed and updated    Exam  /72   Pulse 61   Temp 98.1  F (36.7  C)   Resp 18   Ht 1.727 m (5' 8\")   Wt 73.5 kg (162 lb)   LMP  (LMP Unknown)   SpO2 97%   BMI 24.63 kg/m    Heart is regular without murmur.  No edema    Assessment/Plan  1. Essential hypertension  Controlled.  Continue current lisinopril and amlodipine.  Follow-up in the spring.    2. Encounter for therapeutic drug monitoring  After initiation of lisinopril  - Basic metabolic panel    The longitudinal plan of care for the diagnosis(es)/condition(s) as documented were addressed during this visit. Due to the added complexity in care, I will continue to support Shanna in the subsequent management and with ongoing continuity of care.        Return in about 6 months (around 4/22/2025) for Blood pressure checkup.   Patient Instructions   Continue on current medications.    Goal blood pressure less than 135/85 but not less than 110/60.  Contact me for blood pressures outside the regimen occasionally.  Otherwise, see me in the spring for blood pressure check appointment.    José Miguel Sheppard MD, MD        Current Outpatient Medications   Medication Sig Dispense Refill    amLODIPine (NORVASC) 5 MG tablet [AMLODIPINE (NORVASC) 5 MG TABLET] TAKE 1 TABLET(5 MG) BY MOUTH DAILY 90 tablet 3    lisinopril (ZESTRIL) 2.5 MG tablet Take 1 tablet (2.5 mg) by mouth daily. In addition to amlodipine 90 tablet 1    pravastatin (PRAVACHOL) 20 MG tablet Take 1 tablet (20 mg) by mouth daily 90 tablet 3    Vitamin D, Cholecalciferol, 25 MCG (1000 UT) TABS Take 2 tablets by mouth daily       Allergies   Allergen Reactions    Ampicillin Unknown    Penicillins Rash     Social History     Tobacco Use    Smoking status: Never     Passive exposure: Never    Smokeless tobacco: Never   Vaping Use    Vaping status: Never Used             Pavel Otero is a 78 year old, presenting for the following health " "issues:  Follow Up (BP check. Started Lisinopril, feeling a little tired)        10/22/2024     2:58 PM   Additional Questions   Roomed by Magalie ORO   Accompanied by odalys     History of Present Illness       Hypertension: She presents for follow up of hypertension.  She does check blood pressure  regularly outside of the clinic. Outside blood pressures have been over 140/90. She follows a low salt diet.     She eats 0-1 servings of fruits and vegetables daily.She consumes 1 sweetened beverage(s) daily.She exercises with enough effort to increase her heart rate 9 or less minutes per day.  She exercises with enough effort to increase her heart rate 3 or less days per week.   She is taking medications regularly.                     Objective    /72   Pulse 61   Temp 98.1  F (36.7  C)   Resp 18   Ht 1.727 m (5' 8\")   Wt 73.5 kg (162 lb)   LMP  (LMP Unknown)   SpO2 97%   BMI 24.63 kg/m    Body mass index is 24.63 kg/m .  Physical Exam               Signed Electronically by: José Miguel Sheppard MD    "

## 2025-03-22 DIAGNOSIS — I10 ESSENTIAL HYPERTENSION: ICD-10-CM

## 2025-03-24 RX ORDER — LISINOPRIL 2.5 MG/1
TABLET ORAL
Qty: 90 TABLET | Refills: 1 | Status: SHIPPED | OUTPATIENT
Start: 2025-03-24

## 2025-04-11 ENCOUNTER — TRANSFERRED RECORDS (OUTPATIENT)
Dept: HEALTH INFORMATION MANAGEMENT | Facility: CLINIC | Age: 80
End: 2025-04-11
Payer: COMMERCIAL

## 2025-04-21 DIAGNOSIS — E78.00 PURE HYPERCHOLESTEROLEMIA: ICD-10-CM

## 2025-04-21 RX ORDER — PRAVASTATIN SODIUM 20 MG
20 TABLET ORAL DAILY
Qty: 90 TABLET | Refills: 1 | Status: SHIPPED | OUTPATIENT
Start: 2025-04-21

## 2025-06-24 DIAGNOSIS — I10 ESSENTIAL HYPERTENSION: ICD-10-CM

## 2025-06-24 RX ORDER — LISINOPRIL 2.5 MG/1
TABLET ORAL
Qty: 90 TABLET | Refills: 0 | Status: SHIPPED | OUTPATIENT
Start: 2025-06-24

## 2025-08-26 ENCOUNTER — PATIENT OUTREACH (OUTPATIENT)
Dept: CARE COORDINATION | Facility: CLINIC | Age: 80
End: 2025-08-26
Payer: COMMERCIAL